# Patient Record
Sex: FEMALE | Race: WHITE | NOT HISPANIC OR LATINO | Employment: OTHER | URBAN - METROPOLITAN AREA
[De-identification: names, ages, dates, MRNs, and addresses within clinical notes are randomized per-mention and may not be internally consistent; named-entity substitution may affect disease eponyms.]

---

## 2017-06-16 ENCOUNTER — HOSPITAL ENCOUNTER (EMERGENCY)
Facility: HOSPITAL | Age: 57
Discharge: HOME | End: 2017-06-16
Attending: EMERGENCY MEDICINE
Payer: MEDICARE

## 2017-06-16 VITALS
SYSTOLIC BLOOD PRESSURE: 126 MMHG | DIASTOLIC BLOOD PRESSURE: 83 MMHG | RESPIRATION RATE: 18 BRPM | BODY MASS INDEX: 40.75 KG/M2 | HEART RATE: 99 BPM | OXYGEN SATURATION: 97 % | WEIGHT: 230 LBS | HEIGHT: 63 IN | TEMPERATURE: 98 F

## 2017-06-16 DIAGNOSIS — L03.032 PARONYCHIA OF TOE, LEFT: Primary | ICD-10-CM

## 2017-06-16 LAB — POCT GLUCOSE: 178 MG/DL (ref 70–110)

## 2017-06-16 PROCEDURE — 99283 EMERGENCY DEPT VISIT LOW MDM: CPT | Mod: 25

## 2017-06-16 PROCEDURE — 25000003 PHARM REV CODE 250: Performed by: NURSE PRACTITIONER

## 2017-06-16 PROCEDURE — 82962 GLUCOSE BLOOD TEST: CPT

## 2017-06-16 RX ORDER — MUPIROCIN 20 MG/G
1 OINTMENT TOPICAL
Status: COMPLETED | OUTPATIENT
Start: 2017-06-16 | End: 2017-06-16

## 2017-06-16 RX ORDER — PREGABALIN 50 MG/1
50 CAPSULE ORAL 3 TIMES DAILY
COMMUNITY

## 2017-06-16 RX ORDER — MUPIROCIN 20 MG/G
OINTMENT TOPICAL 2 TIMES DAILY
Qty: 15 G | Refills: 0 | Status: SHIPPED | OUTPATIENT
Start: 2017-06-16 | End: 2017-06-26

## 2017-06-16 RX ORDER — SULFAMETHOXAZOLE AND TRIMETHOPRIM 800; 160 MG/1; MG/1
1 TABLET ORAL
Status: ON HOLD | COMMUNITY
End: 2019-04-29 | Stop reason: HOSPADM

## 2017-06-16 RX ADMIN — MUPIROCIN 22 G: 20 OINTMENT TOPICAL at 02:06

## 2017-06-16 NOTE — DISCHARGE INSTRUCTIONS
Please return to the ED for any new or worsening symptoms: worsening pain or swelling, chest pain, shortness of breath, loss of consciousness or any other concerns. Please follow up with podiatry within in the week.     Take your Bactrim as prescribed. Keep toes clean and dry.

## 2017-06-16 NOTE — ED PROVIDER NOTES
Encounter Date: 6/16/2017    SCRIBE #1 NOTE: I, Julio Cesar Ortiz, am scribing for, and in the presence of, Kamryn Ernst NP. Other sections scribed: HPI, ROS.       History     Chief Complaint   Patient presents with    Toe Pain     States she is a diabetic. L great toe is red, nail is yellow, and toe feels numb. States she has neuropathy     Review of patient's allergies indicates:   Allergen Reactions    Tegretol [carbamazepine]      CC: Toe Infection  HPI: This 57 y.o. morbidly obese female with DM, epilepsy, HTN, osteoporosis and Hx of coma, renal failure presents to the ED c/o redness, numbness, and moderately painful ROM to L great toe that developed 2 days ago. Sx are acute. She reports temperature of 100 at home last night. Pt is concerned that she has has an infection. Pt states that she is about to begin taking Bactrim to treat a UTI she was recently dx'd with. Pt reports that she has appointment with her Podiatrist in 4 or 5 days. She states her CBGs typically run 170-200. She denies chills, chest pain, SOB, abdominal pain, N/V.            Past Medical History:   Diagnosis Date    Diabetes mellitus     Epilepsy     Hypertension     Osteoporosis      Past Surgical History:   Procedure Laterality Date    ABDOMINAL SURGERY      BREAST SURGERY      CHOLECYSTECTOMY       History reviewed. No pertinent family history.  Social History   Substance Use Topics    Smoking status: Never Smoker    Smokeless tobacco: Not on file    Alcohol use No     Review of Systems   Constitutional: Negative for chills and fever.   HENT: Negative for ear pain, rhinorrhea and sore throat.    Eyes: Negative for pain and visual disturbance.   Respiratory: Negative for cough and shortness of breath.    Cardiovascular: Negative for chest pain.   Gastrointestinal: Negative for abdominal pain, diarrhea, nausea and vomiting.   Genitourinary: Negative for difficulty urinating and dysuria.   Musculoskeletal: Positive for  arthralgias (L great toe).   Skin: Positive for color change (erythema to L great toe).   Neurological: Positive for numbness (L great toe). Negative for dizziness, syncope and headaches.       Physical Exam     Initial Vitals [06/16/17 1259]   BP Pulse Resp Temp SpO2   126/83 99 18 98.3 °F (36.8 °C) 97 %     Physical Exam    Constitutional: Vital signs are normal. She appears well-developed and well-nourished.  Non-toxic appearance.   Eyes: EOM are normal.   Neck: Full passive range of motion without pain. Neck supple. No no neck rigidity.   Cardiovascular: Normal rate, S1 normal, S2 normal and normal heart sounds. Exam reveals no gallop.    No murmur heard.  Pulmonary/Chest: Effort normal and breath sounds normal. No tachypnea. She has no decreased breath sounds. She has no wheezes. She has no rhonchi. She has no rales.   Musculoskeletal:   Left great toenail is yellow and thickened, partially avulsed at the medial base; mild erythema to cuticle area   Neurological: She is alert and oriented to person, place, and time. She has normal strength. Gait normal. GCS eye subscore is 4. GCS verbal subscore is 5. GCS motor subscore is 6.   Skin: Skin is warm and dry. No rash noted.         ED Course   Procedures  Labs Reviewed   POCT GLUCOSE - Abnormal; Notable for the following:        Result Value    POCT Glucose 178 (*)     All other components within normal limits             Medical Decision Making:   ED Management:  This is a 57-year-old female with diabetes who presents to the ED with complaints of pain to the left great toe.  She is afebrile and well-appearing.  She denies trauma.  She began taking Bactrim today for a UTI.  On exam, left toenail is yellow and thickened.  It is partially avulsed.  Likely paronychia with surrounding erythema.  Advised to continue her Bactrim as prescribed.  As the patient has an appointment with podiatry in a few days, I advised follow-up with her provider regarding toenail removal.   No indication for emergent drainage today.  Advised patient that her toenail may fall off over the weekend.  Local wound care given.  Blood sugar 178.  Discharged home with instructions for supportive care and follow-up.  Return precautions given.  Patient was also seen and evaluated by Dr. Desir, who agrees with her plan of care.            Scribe Attestation:   Scribe #1: I performed the above scribed service and the documentation accurately describes the services I performed. I attest to the accuracy of the note.    Attending Attestation:     Physician Attestation Statement for NP/PA:   I have conducted a face to face encounter with this patient in addition to the NP/PA, due to NP/PA Request    Other NP/PA Attestation Additions:      Medical Decision Makin y.o. female presents emergency Department with left great toe infection.  Patient has evidence of erythema surrounding left great toenail, with some avulsion of the base of the toernail and purulent appearing fluid collection. It appears to be already draining. States she is scheduled to see podiatry this upcoming week. Will start on antibiotics and send to podiatry for definitive management. I have seen and examined this patient with mid-level provider and agree with physical exam, assessment and plan.        Physician Attestation for Scribe:  Physician Attestation Statement for Scribe #1: I, Kamryn Triana NP, reviewed documentation, as scribed by Julio Cesar Alcantar in my presence, and it is both accurate and complete.                 ED Course     Clinical Impression:   The encounter diagnosis was Paronychia of toe, left.    Disposition:   Disposition: Discharged  Condition: Stable       Kamryn Triana NP  [phone]       Mary Knight MD  [phone]

## 2019-04-26 ENCOUNTER — HOSPITAL ENCOUNTER (INPATIENT)
Facility: HOSPITAL | Age: 59
LOS: 2 days | Discharge: HOME | DRG: 689 | End: 2019-04-29
Attending: EMERGENCY MEDICINE | Admitting: INTERNAL MEDICINE
Payer: MEDICARE

## 2019-04-26 DIAGNOSIS — F11.920: ICD-10-CM

## 2019-04-26 DIAGNOSIS — E86.1 HYPOVOLEMIA: ICD-10-CM

## 2019-04-26 DIAGNOSIS — N30.00 ACUTE CYSTITIS WITHOUT HEMATURIA: Primary | ICD-10-CM

## 2019-04-26 DIAGNOSIS — I95.9 HYPOTENSION, UNSPECIFIED HYPOTENSION TYPE: ICD-10-CM

## 2019-04-26 DIAGNOSIS — R41.82 ALTERED MENTAL STATUS: ICD-10-CM

## 2019-04-26 DIAGNOSIS — R91.8 PULMONARY INFILTRATE: ICD-10-CM

## 2019-04-26 DIAGNOSIS — E86.0 DEHYDRATION: ICD-10-CM

## 2019-04-26 DIAGNOSIS — I95.9 HYPOTENSION: ICD-10-CM

## 2019-04-26 PROBLEM — J18.9 PNEUMONIA DUE TO INFECTIOUS ORGANISM: Status: ACTIVE | Noted: 2019-04-26

## 2019-04-26 PROBLEM — I10 ESSENTIAL HYPERTENSION: Status: ACTIVE | Noted: 2019-04-26

## 2019-04-26 PROBLEM — G93.40 ACUTE ENCEPHALOPATHY: Status: ACTIVE | Noted: 2019-04-26

## 2019-04-26 PROBLEM — N18.30 CKD (CHRONIC KIDNEY DISEASE) STAGE 3, GFR 30-59 ML/MIN: Status: ACTIVE | Noted: 2019-04-26

## 2019-04-26 LAB
ALBUMIN SERPL BCP-MCNC: 3 G/DL (ref 3.5–5.2)
ALP SERPL-CCNC: 95 U/L (ref 55–135)
ALT SERPL W/O P-5'-P-CCNC: 7 U/L (ref 10–44)
ANION GAP SERPL CALC-SCNC: 5 MMOL/L (ref 8–16)
APTT BLDCRRT: 30.9 SEC (ref 21–32)
AST SERPL-CCNC: 10 U/L (ref 10–40)
BACTERIA #/AREA URNS HPF: ABNORMAL /HPF
BASOPHILS # BLD AUTO: 0.03 K/UL (ref 0–0.2)
BASOPHILS NFR BLD: 0.2 % (ref 0–1.9)
BILIRUB SERPL-MCNC: 0.3 MG/DL (ref 0.1–1)
BILIRUB UR QL STRIP: ABNORMAL
BNP SERPL-MCNC: 34 PG/ML (ref 0–99)
BUN SERPL-MCNC: 24 MG/DL (ref 8–23)
CALCIUM SERPL-MCNC: 9.5 MG/DL (ref 8.7–10.5)
CHLORIDE SERPL-SCNC: 101 MMOL/L (ref 95–110)
CK SERPL-CCNC: 106 U/L (ref 20–180)
CLARITY UR: ABNORMAL
CO2 SERPL-SCNC: 28 MMOL/L (ref 23–29)
COLOR UR: ABNORMAL
CREAT SERPL-MCNC: 1.8 MG/DL (ref 0.5–1.4)
CTP QC/QA: YES
DIFFERENTIAL METHOD: ABNORMAL
EOSINOPHIL # BLD AUTO: 0 K/UL (ref 0–0.5)
EOSINOPHIL NFR BLD: 0 % (ref 0–8)
ERYTHROCYTE [DISTWIDTH] IN BLOOD BY AUTOMATED COUNT: 15.5 % (ref 11.5–14.5)
EST. GFR  (AFRICAN AMERICAN): 35 ML/MIN/1.73 M^2
EST. GFR  (NON AFRICAN AMERICAN): 30 ML/MIN/1.73 M^2
FLUAV AG NPH QL: NEGATIVE
FLUBV AG NPH QL: NEGATIVE
GLUCOSE SERPL-MCNC: 285 MG/DL (ref 70–110)
GLUCOSE UR QL STRIP: ABNORMAL
HCT VFR BLD AUTO: 34 % (ref 37–48.5)
HGB BLD-MCNC: 10.6 G/DL (ref 12–16)
HGB UR QL STRIP: ABNORMAL
HYALINE CASTS #/AREA URNS LPF: 0 /LPF
INR PPP: 1 (ref 0.8–1.2)
KETONES UR QL STRIP: ABNORMAL
LACTATE SERPL-SCNC: 0.8 MMOL/L (ref 0.5–2.2)
LEUKOCYTE ESTERASE UR QL STRIP: ABNORMAL
LIPASE SERPL-CCNC: 9 U/L (ref 4–60)
LYMPHOCYTES # BLD AUTO: 1.1 K/UL (ref 1–4.8)
LYMPHOCYTES NFR BLD: 8.5 % (ref 18–48)
MAGNESIUM SERPL-MCNC: 1.6 MG/DL (ref 1.6–2.6)
MCH RBC QN AUTO: 26.7 PG (ref 27–31)
MCHC RBC AUTO-ENTMCNC: 31.2 G/DL (ref 32–36)
MCV RBC AUTO: 86 FL (ref 82–98)
MICROSCOPIC COMMENT: ABNORMAL
MONOCYTES # BLD AUTO: 0.9 K/UL (ref 0.3–1)
MONOCYTES NFR BLD: 7.2 % (ref 4–15)
NEUTROPHILS # BLD AUTO: 10.6 K/UL (ref 1.8–7.7)
NEUTROPHILS NFR BLD: 84.1 % (ref 38–73)
NITRITE UR QL STRIP: NEGATIVE
PH UR STRIP: 5 [PH] (ref 5–8)
PHOSPHATE SERPL-MCNC: 3.4 MG/DL (ref 2.7–4.5)
PLATELET # BLD AUTO: 270 K/UL (ref 150–350)
PMV BLD AUTO: 10.6 FL (ref 9.2–12.9)
POCT GLUCOSE: 225 MG/DL (ref 70–110)
POTASSIUM SERPL-SCNC: 4.1 MMOL/L (ref 3.5–5.1)
PROCALCITONIN SERPL IA-MCNC: 0.32 NG/ML
PROT SERPL-MCNC: 7.1 G/DL (ref 6–8.4)
PROT UR QL STRIP: ABNORMAL
PROTHROMBIN TIME: 10.5 SEC (ref 9–12.5)
RBC # BLD AUTO: 3.97 M/UL (ref 4–5.4)
RBC #/AREA URNS HPF: 2 /HPF (ref 0–4)
SODIUM SERPL-SCNC: 134 MMOL/L (ref 136–145)
SP GR UR STRIP: 1.02 (ref 1–1.03)
TROPONIN I SERPL DL<=0.01 NG/ML-MCNC: <0.006 NG/ML (ref 0–0.03)
URN SPEC COLLECT METH UR: ABNORMAL
UROBILINOGEN UR STRIP-ACNC: ABNORMAL EU/DL
WBC # BLD AUTO: 12.57 K/UL (ref 3.9–12.7)
WBC #/AREA URNS HPF: >100 /HPF (ref 0–5)

## 2019-04-26 PROCEDURE — 96376 TX/PRO/DX INJ SAME DRUG ADON: CPT | Performed by: EMERGENCY MEDICINE

## 2019-04-26 PROCEDURE — 96372 THER/PROPH/DIAG INJ SC/IM: CPT | Mod: 59 | Performed by: EMERGENCY MEDICINE

## 2019-04-26 PROCEDURE — 96365 THER/PROPH/DIAG IV INF INIT: CPT

## 2019-04-26 PROCEDURE — P9612 CATHETERIZE FOR URINE SPEC: HCPCS

## 2019-04-26 PROCEDURE — 84145 PROCALCITONIN (PCT): CPT

## 2019-04-26 PROCEDURE — 83690 ASSAY OF LIPASE: CPT

## 2019-04-26 PROCEDURE — 82550 ASSAY OF CK (CPK): CPT

## 2019-04-26 PROCEDURE — 96366 THER/PROPH/DIAG IV INF ADDON: CPT

## 2019-04-26 PROCEDURE — 83735 ASSAY OF MAGNESIUM: CPT

## 2019-04-26 PROCEDURE — 96361 HYDRATE IV INFUSION ADD-ON: CPT | Performed by: EMERGENCY MEDICINE

## 2019-04-26 PROCEDURE — 81000 URINALYSIS NONAUTO W/SCOPE: CPT

## 2019-04-26 PROCEDURE — S5571 INSULIN DISPOS PEN 3 ML: HCPCS | Performed by: NURSE PRACTITIONER

## 2019-04-26 PROCEDURE — 25000003 PHARM REV CODE 250: Performed by: EMERGENCY MEDICINE

## 2019-04-26 PROCEDURE — 87040 BLOOD CULTURE FOR BACTERIA: CPT | Mod: 59

## 2019-04-26 PROCEDURE — 25000003 PHARM REV CODE 250: Performed by: NURSE PRACTITIONER

## 2019-04-26 PROCEDURE — 93010 EKG 12-LEAD: ICD-10-PCS | Mod: ,,, | Performed by: INTERNAL MEDICINE

## 2019-04-26 PROCEDURE — 93005 ELECTROCARDIOGRAM TRACING: CPT

## 2019-04-26 PROCEDURE — 87088 URINE BACTERIA CULTURE: CPT

## 2019-04-26 PROCEDURE — 84100 ASSAY OF PHOSPHORUS: CPT

## 2019-04-26 PROCEDURE — 63600175 PHARM REV CODE 636 W HCPCS: Performed by: NURSE PRACTITIONER

## 2019-04-26 PROCEDURE — 84484 ASSAY OF TROPONIN QUANT: CPT

## 2019-04-26 PROCEDURE — 99291 CRITICAL CARE FIRST HOUR: CPT | Mod: 25

## 2019-04-26 PROCEDURE — 87502 INFLUENZA DNA AMP PROBE: CPT

## 2019-04-26 PROCEDURE — 83605 ASSAY OF LACTIC ACID: CPT

## 2019-04-26 PROCEDURE — 87077 CULTURE AEROBIC IDENTIFY: CPT

## 2019-04-26 PROCEDURE — 85025 COMPLETE CBC W/AUTO DIFF WBC: CPT

## 2019-04-26 PROCEDURE — 87186 SC STD MICRODIL/AGAR DIL: CPT

## 2019-04-26 PROCEDURE — 83880 ASSAY OF NATRIURETIC PEPTIDE: CPT

## 2019-04-26 PROCEDURE — 85610 PROTHROMBIN TIME: CPT

## 2019-04-26 PROCEDURE — 93010 ELECTROCARDIOGRAM REPORT: CPT | Mod: ,,, | Performed by: INTERNAL MEDICINE

## 2019-04-26 PROCEDURE — 80053 COMPREHEN METABOLIC PANEL: CPT

## 2019-04-26 PROCEDURE — 85730 THROMBOPLASTIN TIME PARTIAL: CPT

## 2019-04-26 PROCEDURE — 96367 TX/PROPH/DG ADDL SEQ IV INF: CPT

## 2019-04-26 PROCEDURE — 63600175 PHARM REV CODE 636 W HCPCS: Performed by: EMERGENCY MEDICINE

## 2019-04-26 PROCEDURE — 87086 URINE CULTURE/COLONY COUNT: CPT

## 2019-04-26 PROCEDURE — G0378 HOSPITAL OBSERVATION PER HR: HCPCS

## 2019-04-26 RX ORDER — PRIMIDONE 50 MG/1
100 TABLET ORAL 2 TIMES DAILY
Status: DISCONTINUED | OUTPATIENT
Start: 2019-04-26 | End: 2019-04-26

## 2019-04-26 RX ORDER — DULOXETIN HYDROCHLORIDE 30 MG/1
60 CAPSULE, DELAYED RELEASE ORAL DAILY
Status: DISCONTINUED | OUTPATIENT
Start: 2019-04-27 | End: 2019-04-27

## 2019-04-26 RX ORDER — IBUPROFEN 200 MG
16 TABLET ORAL
Status: DISCONTINUED | OUTPATIENT
Start: 2019-04-26 | End: 2019-04-29 | Stop reason: HOSPADM

## 2019-04-26 RX ORDER — GLUCAGON 1 MG
1 KIT INJECTION
Status: DISCONTINUED | OUTPATIENT
Start: 2019-04-26 | End: 2019-04-29 | Stop reason: HOSPADM

## 2019-04-26 RX ORDER — LISINOPRIL 40 MG/1
40 TABLET ORAL DAILY
Status: ON HOLD | COMMUNITY
End: 2019-04-29 | Stop reason: HOSPADM

## 2019-04-26 RX ORDER — TRAZODONE HYDROCHLORIDE 100 MG/1
100 TABLET ORAL NIGHTLY
Status: ON HOLD | COMMUNITY
End: 2019-04-27

## 2019-04-26 RX ORDER — IBUPROFEN 200 MG
24 TABLET ORAL
Status: DISCONTINUED | OUTPATIENT
Start: 2019-04-26 | End: 2019-04-29 | Stop reason: HOSPADM

## 2019-04-26 RX ORDER — PREGABALIN 50 MG/1
50 CAPSULE ORAL 3 TIMES DAILY
Status: DISCONTINUED | OUTPATIENT
Start: 2019-04-27 | End: 2019-04-27

## 2019-04-26 RX ORDER — PRAVASTATIN SODIUM 40 MG/1
80 TABLET ORAL NIGHTLY
Status: DISCONTINUED | OUTPATIENT
Start: 2019-04-26 | End: 2019-04-29 | Stop reason: HOSPADM

## 2019-04-26 RX ORDER — ENOXAPARIN SODIUM 100 MG/ML
40 INJECTION SUBCUTANEOUS EVERY 24 HOURS
Status: DISCONTINUED | OUTPATIENT
Start: 2019-04-26 | End: 2019-04-29 | Stop reason: HOSPADM

## 2019-04-26 RX ORDER — POLYETHYLENE GLYCOL 3350 17 G/17G
17 POWDER, FOR SOLUTION ORAL DAILY
Status: DISCONTINUED | OUTPATIENT
Start: 2019-04-27 | End: 2019-04-29 | Stop reason: HOSPADM

## 2019-04-26 RX ORDER — PREGABALIN 50 MG/1
50 CAPSULE ORAL 3 TIMES DAILY
Status: DISCONTINUED | OUTPATIENT
Start: 2019-04-26 | End: 2019-04-26

## 2019-04-26 RX ORDER — ACETAMINOPHEN 325 MG/1
325 TABLET ORAL EVERY 6 HOURS PRN
Status: DISCONTINUED | OUTPATIENT
Start: 2019-04-26 | End: 2019-04-29 | Stop reason: HOSPADM

## 2019-04-26 RX ORDER — ZONISAMIDE 100 MG/1
200 CAPSULE ORAL 2 TIMES DAILY
Status: DISCONTINUED | OUTPATIENT
Start: 2019-04-26 | End: 2019-04-27

## 2019-04-26 RX ORDER — LEVETIRACETAM 100 MG/ML
750 SOLUTION ORAL 2 TIMES DAILY
Status: DISCONTINUED | OUTPATIENT
Start: 2019-04-26 | End: 2019-04-26

## 2019-04-26 RX ORDER — PRIMIDONE 50 MG/1
100 TABLET ORAL 2 TIMES DAILY
Status: DISCONTINUED | OUTPATIENT
Start: 2019-04-27 | End: 2019-04-27

## 2019-04-26 RX ORDER — MIRTAZAPINE 7.5 MG/1
7.5 TABLET, FILM COATED ORAL NIGHTLY
Status: DISCONTINUED | OUTPATIENT
Start: 2019-04-26 | End: 2019-04-26

## 2019-04-26 RX ORDER — OXYCODONE HYDROCHLORIDE 5 MG/1
5 TABLET ORAL EVERY 4 HOURS PRN
Status: DISCONTINUED | OUTPATIENT
Start: 2019-04-26 | End: 2019-04-26

## 2019-04-26 RX ORDER — SODIUM CHLORIDE 0.9 % (FLUSH) 0.9 %
10 SYRINGE (ML) INJECTION
Status: DISCONTINUED | OUTPATIENT
Start: 2019-04-26 | End: 2019-04-29 | Stop reason: HOSPADM

## 2019-04-26 RX ORDER — METOPROLOL TARTRATE 25 MG/1
25 TABLET, FILM COATED ORAL 2 TIMES DAILY
COMMUNITY

## 2019-04-26 RX ORDER — DULOXETIN HYDROCHLORIDE 60 MG/1
60 CAPSULE, DELAYED RELEASE ORAL DAILY
COMMUNITY

## 2019-04-26 RX ORDER — SODIUM CHLORIDE 9 MG/ML
INJECTION, SOLUTION INTRAVENOUS CONTINUOUS
Status: DISCONTINUED | OUTPATIENT
Start: 2019-04-26 | End: 2019-04-29

## 2019-04-26 RX ORDER — AMLODIPINE BESYLATE 10 MG/1
10 TABLET ORAL DAILY
Status: ON HOLD | COMMUNITY
End: 2019-04-29 | Stop reason: HOSPADM

## 2019-04-26 RX ORDER — INSULIN ASPART 100 [IU]/ML
1-10 INJECTION, SOLUTION INTRAVENOUS; SUBCUTANEOUS
Status: DISCONTINUED | OUTPATIENT
Start: 2019-04-26 | End: 2019-04-29 | Stop reason: HOSPADM

## 2019-04-26 RX ADMIN — PIPERACILLIN AND TAZOBACTAM 4.5 G: 4; .5 INJECTION, POWDER, LYOPHILIZED, FOR SOLUTION INTRAVENOUS; PARENTERAL at 10:04

## 2019-04-26 RX ADMIN — INSULIN ASPART 2 UNITS: 100 INJECTION, SOLUTION INTRAVENOUS; SUBCUTANEOUS at 10:04

## 2019-04-26 RX ADMIN — SODIUM CHLORIDE: 0.9 INJECTION, SOLUTION INTRAVENOUS at 10:04

## 2019-04-26 RX ADMIN — SODIUM CHLORIDE 1000 ML: 0.9 INJECTION, SOLUTION INTRAVENOUS at 05:04

## 2019-04-26 RX ADMIN — ZONISAMIDE 200 MG: 100 CAPSULE ORAL at 10:04

## 2019-04-26 RX ADMIN — SODIUM CHLORIDE 3267 ML: 0.9 INJECTION, SOLUTION INTRAVENOUS at 02:04

## 2019-04-26 RX ADMIN — PRAVASTATIN SODIUM 80 MG: 40 TABLET ORAL at 10:04

## 2019-04-26 RX ADMIN — INSULIN DETEMIR 10 UNITS: 100 INJECTION, SOLUTION SUBCUTANEOUS at 10:04

## 2019-04-26 RX ADMIN — VANCOMYCIN HYDROCHLORIDE 1750 MG: 500 INJECTION, POWDER, LYOPHILIZED, FOR SOLUTION INTRAVENOUS at 03:04

## 2019-04-26 RX ADMIN — PIPERACILLIN AND TAZOBACTAM 4.5 G: 4; .5 INJECTION, POWDER, LYOPHILIZED, FOR SOLUTION INTRAVENOUS; PARENTERAL at 03:04

## 2019-04-26 RX ADMIN — ENOXAPARIN SODIUM 40 MG: 100 INJECTION SUBCUTANEOUS at 10:04

## 2019-04-26 NOTE — ED PROVIDER NOTES
"Encounter Date: 4/26/2019    SCRIBE #1 NOTE: I, Naresh Gomez, am scribing for, and in the presence of,  Victor Hugo Sung MD. I have scribed the following portions of the note - Other sections scribed: HPI and ROS.       History     Chief Complaint   Patient presents with    Altered Mental Status     Per EMS, family called for pt being " in and out of conciousness" x 3 days. Family reports pt incontinent and urinating on couch. Pt given 1mg narcan with pupil response. Pt responds to verbal.      CC: Altered Mental Status    HPI: This 59 y.o F with a hx of DM, Epilepsy, HTN, chronic knee pain and osteoporosis presents to the ED via EMS c/o AMS x3 days. Per EMS, the pt's son reported the pt has been "in and out of consciousness." EMS reports an initial CBG of 301 mg/dL, BP of 90/56mmHg, pinpoint pupils and slurred speech. EMS administered a 350cc saline Bolus and 1mg of narcan which improved her slurred speech and respiratory rate. Additionally, the pt also reports urinary incontinence, dysuria, suprapubic, diarrhea and chills. The pt denies fever, chills, diaphoresis, nausea, emesis, abdominal pain, chest pain, cough, otalgia, sore throat and rash. She does not smoke cigarettes, consume EtOH or use illicit drugs. She does take Oxycontin 2mg x4/day for her chronic knee pain.    The history is provided by the patient. No  was used.     Review of patient's allergies indicates:   Allergen Reactions    Tegretol [carbamazepine]      Past Medical History:   Diagnosis Date    Diabetes mellitus     Epilepsy     Hypertension     Osteoporosis      Past Surgical History:   Procedure Laterality Date    ABDOMINAL SURGERY      BREAST SURGERY      CHOLECYSTECTOMY       History reviewed. No pertinent family history.  Social History     Tobacco Use    Smoking status: Never Smoker   Substance Use Topics    Alcohol use: No    Drug use: No     Review of Systems   Constitutional: Positive for chills. Negative " for diaphoresis and fever.   HENT: Negative for congestion, ear pain, rhinorrhea and sore throat.    Eyes: Negative for pain and visual disturbance.   Respiratory: Negative for cough and shortness of breath.    Cardiovascular: Negative for chest pain.   Gastrointestinal: Positive for abdominal pain (suprapubic) and diarrhea. Negative for nausea and vomiting.   Genitourinary: Positive for dysuria.        (+) urinary incontinence   Musculoskeletal: Negative for back pain and neck pain.        (+) chronic knee pain   Skin: Negative for rash.   Neurological: Negative for weakness, numbness and headaches.        (+) slurred speech       Physical Exam     Initial Vitals [04/26/19 1348]   BP Pulse Resp Temp SpO2   135/66 (!) 114 18 99.1 °F (37.3 °C) 100 %      MAP       --         Physical Exam  The patient was examined specifically for the following:   General:No significant distress, Good color, Warm and dry. Head and neck:Scalp atraumatic, Neck supple. Neurological:Appropriate conversation, Gross motor deficits. Eyes:Conjugate gaze, Clear corneas. ENT: No epistaxis. Cardiac: Regular rate and rhythm, Grossly normal heart tones. Pulmonary: Wheezing, Rales. Gastrointestinal: Abdominal tenderness, Abdominal distention. Musculoskeletal: Extremity deformity, Apparent pain with range of motion of the joints. Skin: Rash.   The findings on examination were normal except for the following:  Patient is morbidly obese.  She has pain with range of motion of both knees.  The neck is supple. The patient is conversational and appropriate conversation.  Cranial nerves motor and sensory examination grossly unremarkable.  Lungs are clear and free of wheezing rales or rhonchi.  The abdomen is nontender.  There is no guarding rebound mass or distention. The patient remains sleepy.  ED Course   Critical Care  Date/Time: 4/26/2019 6:05 PM  Performed by: Victor Hugo Sung MD  Authorized by: Victor Hugo Sung MD   Direct patient critical care  time: 22 minutes  Additional history critical care time: 11 minutes  Ordering / reviewing critical care time: 11 minutes  Documentation critical care time: 11 minutes  Consulting other physicians critical care time: 5 minutes  Consult with family critical care time: 5 minutes  Total critical care time (exclusive of procedural time) : 65 minutes  Critical care time was exclusive of teaching time and separately billable procedures and treating other patients.  Critical care was necessary to treat or prevent imminent or life-threatening deterioration of the following conditions: circulatory failure, CNS failure or compromise and shock.  Critical care was time spent personally by me on the following activities: discussions with primary provider, evaluation of patient's response to treatment, obtaining history from patient or surrogate, ordering and review of laboratory studies, pulse oximetry, review of old charts, re-evaluation of patient's condition, ordering and review of radiographic studies, ordering and performing treatments and interventions and examination of patient.        Labs Reviewed   CBC W/ AUTO DIFFERENTIAL - Abnormal; Notable for the following components:       Result Value    RBC 3.97 (*)     Hemoglobin 10.6 (*)     Hematocrit 34.0 (*)     MCH 26.7 (*)     MCHC 31.2 (*)     RDW 15.5 (*)     Gran # (ANC) 10.6 (*)     Gran% 84.1 (*)     Lymph% 8.5 (*)     All other components within normal limits   COMPREHENSIVE METABOLIC PANEL - Abnormal; Notable for the following components:    Sodium 134 (*)     Glucose 285 (*)     BUN, Bld 24 (*)     Creatinine 1.8 (*)     Albumin 3.0 (*)     ALT 7 (*)     Anion Gap 5 (*)     eGFR if  35 (*)     eGFR if non  30 (*)     All other components within normal limits   PROCALCITONIN - Abnormal; Notable for the following components:    Procalcitonin 0.32 (*)     All other components within normal limits   URINALYSIS - Abnormal; Notable for the  following components:    Appearance, UA Cloudy (*)     Protein, UA 2+ (*)     Glucose, UA 1+ (*)     Ketones, UA Trace (*)     Bilirubin (UA) 1+ (*)     Occult Blood UA 1+ (*)     Urobilinogen, UA 2.0-3.0 (*)     Leukocytes, UA 2+ (*)     All other components within normal limits   URINALYSIS MICROSCOPIC - Abnormal; Notable for the following components:    WBC, UA >100 (*)     Bacteria, UA Few (*)     All other components within normal limits   CULTURE, BLOOD   CULTURE, BLOOD   CULTURE, URINE   LACTIC ACID, PLASMA   MAGNESIUM   PHOSPHORUS   APTT   PROTIME-INR   B-TYPE NATRIURETIC PEPTIDE   LIPASE   TROPONIN I   POCT INFLUENZA A/B     EKG Readings: (Independently Interpreted)   This patient is in a normal sinus tachycardia with a heart rate of 114.  The patient has right bundle branch block.  There are nonspecific ST segment and T-wave changes.  There is no definite evidence of acute myocardial infarction or malignant arrhythmia.      ECG Results          EKG 12-lead (In process)  Result time 04/26/19 14:34:46    In process by Interface, Lab In [facility] (04/26/19 14:34:46)                 Narrative:    Test Reason : R41.82,    Vent. Rate : 114 BPM     Atrial Rate : 114 BPM     P-R Int : 150 ms          QRS Dur : 144 ms      QT Int : 358 ms       P-R-T Axes : 059 081 027 degrees     QTc Int : 493 ms    Sinus tachycardia  Right bundle branch block  Possible Inferior infarct (cited on or before 15-JUL-2016)  Abnormal ECG  When compared with ECG of 15-JUL-2016 19:39,  No significant change was found    Referred By: System System           Confirmed By:                   In process by Interface, Lab In [facility] (04/26/19 14:28:20)                 Narrative:    Test Reason : R41.82,    Vent. Rate : 114 BPM     Atrial Rate : 114 BPM     P-R Int : 150 ms          QRS Dur : 144 ms      QT Int : 358 ms       P-R-T Axes : 059 081 027 degrees     QTc Int : 493 ms    Sinus tachycardia  Right bundle branch block  Possible Inferior  infarct (cited on or before 15-JUL-2016)  Abnormal ECG  When compared with ECG of 15-JUL-2016 19:39,  No significant change was found    Referred By: System System           Confirmed By:                             Imaging Results          X-Ray Chest AP Portable (Final result)  Result time 04/26/19 14:24:46    Final result by Kvng Bunn MD (04/26/19 14:24:46)                 Impression:      Ill-defined patchy bilateral airspace opacities with predominance in the right hemithorax.  The findings may represent evolving multifocal pneumonia or worsening pulmonary edema.    Stable enlargement of the cardiomediastinal silhouette.    Interval removal of right-sided access catheters.      Electronically signed by: Kvng Bunn MD  Date:    04/26/2019  Time:    14:24             Narrative:    EXAMINATION:  XR CHEST AP PORTABLE    CLINICAL HISTORY:  Sepsis;    TECHNIQUE:  Single frontal view of the chest was performed.    COMPARISON:  07/26/2016.    FINDINGS:  The right sided central access catheters are no longer present.  The trachea is unremarkable.  There is stable enlargement of the cardiomediastinal silhouette.  The hemidiaphragms are unremarkable.  There is no evidence of free air beneath the hemidiaphragms.  There are no pleural effusions.  There is no evidence of a pneumothorax.  There is no evidence of pneumomediastinum.  There are ill-defined patchy bilateral airspace opacities with predominance in the right hemithorax.  There are degenerative changes in the osseous structures.                              Medical decision making:  Given the above, this patient presents to the emergency with an altered mental status.  Her urine is positive for greater than 100 white cells per high-power field by catheterized specimen.  I believe the patient likely has urinary tract infection she has dysuria and urinary incontinence.  She also has some infiltrates on chest x-ray.  A BNP is pending.  Pneumonia is considered but  she has very little cough.  She is awake and alert and conversational at 6:00 p.m..  She is not febrile or toxic.  She was tachycardic on arrival.  Her heart rate is now 91.  Despite 30 milliliters/kilogram of IV fluid she has made very little urine.  I believe that she is dehydrated.  I believe narcotics are playing a role in her altered mental status.  I will admit and treat with IV antibiotics and have her evaluated by Hospital Medicine                Scribe Attestation:   Scribe #1: I performed the above scribed service and the documentation accurately describes the services I performed. I attest to the accuracy of the note.    Attending Attestation:           Physician Attestation for Scribe:  Physician Attestation Statement for Scribe #1: I, Victor Hugo Sung MD, reviewed documentation, as scribed by Naresh Gomez in my presence, and it is both accurate and complete.                    Clinical Impression:       ICD-10-CM ICD-9-CM   1. Acute cystitis without hematuria N30.00 595.0   2. Altered mental status R41.82 780.97   3. Pulmonary infiltrate R91.8 793.19   4. Acute narcotic intoxication without complication F11.920 305.80   5. Dehydration E86.0 276.51   6. Hypotension, unspecified hypotension type I95.9 458.9                                Victor Hugo Sung MD  04/26/19 9961

## 2019-04-26 NOTE — PROGRESS NOTES
Pharmacokinetic Initial Assessment: IV Vancomycin    Assessment/Plan:    Initiate intravenous vancomycin with loading dose of 1750 mg once followed by a maintenance dose of vancomycin 1250mg IV every 24 hours  Desired empiric serum trough concentration is 10 to 20 mcg/mL.  Draw vancomycin trough level 30 min prior to fourth dose on 4/29/19 at approximately 15:15  Pharmacy will continue to follow and monitor vancomycin.      Please contact pharmacy at extension [phone] with any questions regarding this assessment.     Thank you for the consult,   Hafsa Liao     Patient brief summary:  Carolyn Mace is a 59 y.o. female initiated on antimicrobial therapy with IV Vancomycin for treatment of suspected urinary tract infection    Drug Allergies:   Review of patient's allergies indicates:   Allergen Reactions    Tegretol [carbamazepine]        Actual Body Weight:   108.9kg    Renal Function:   Estimated Creatinine Clearance: 38.9 mL/min (A) (based on SCr of 1.8 mg/dL (H)).,     Dialysis Method (if applicable):      CBC (last 72 hours):  Recent Labs   Lab Result Units 04/26/19  1455   WBC K/uL 12.57   Hemoglobin g/dL 10.6*   Hematocrit % 34.0*   Platelets K/uL 270   Gran% % 84.1*   Lymph% % 8.5*   Mono% % 7.2   Eosinophil% % 0.0   Basophil% % 0.2   Differential Method  Automated       Metabolic Panel (last 72 hours):  Recent Labs   Lab Result Units 04/26/19  1455 04/26/19  1459   Sodium mmol/L 134*  --    Potassium mmol/L 4.1  --    Chloride mmol/L 101  --    CO2 mmol/L 28  --    Glucose mg/dL 285*  --    Glucose, UA   --  1+*   BUN, Bld mg/dL 24*  --    Creatinine mg/dL 1.8*  --    Albumin g/dL 3.0*  --    Total Bilirubin mg/dL 0.3  --    Alkaline Phosphatase U/L 95  --    AST U/L 10  --    ALT U/L 7*  --    Magnesium mg/dL 1.6  --    Phosphorus mg/dL 3.4  --        Drug levels (last 3 results):  No results for input(s): VANCOMYCINRA, VANCOMYCINPE, VANCOMYCINTR in the last 72 hours.    Microbiologic  Results:  Microbiology Results (last 7 days)     Procedure Component Value Units Date/Time    Blood culture x two cultures. Draw prior to antibiotics. [[id number]] Collected:  04/26/19 1455    Order Status:  Sent Specimen:  Blood from Peripheral, Antecubital, Left Updated:  04/26/19 1517    Blood culture x two cultures. Draw prior to antibiotics. [[id number]] Collected:  04/26/19 1505    Order Status:  Sent Specimen:  Blood from Peripheral, Antecubital, Right Updated:  04/26/19 1517    Urine culture [[id number]] Collected:  04/26/19 1449    Order Status:  Sent Specimen:  Urine, Catheterized Updated:  04/26/19 1516

## 2019-04-27 PROBLEM — E86.1 HYPOVOLEMIA: Status: ACTIVE | Noted: 2019-04-27

## 2019-04-27 LAB
ANION GAP SERPL CALC-SCNC: 6 MMOL/L (ref 8–16)
BASOPHILS # BLD AUTO: 0.02 K/UL (ref 0–0.2)
BASOPHILS NFR BLD: 0.2 % (ref 0–1.9)
BUN SERPL-MCNC: 22 MG/DL (ref 8–23)
CALCIUM SERPL-MCNC: 8.4 MG/DL (ref 8.7–10.5)
CHLORIDE SERPL-SCNC: 108 MMOL/L (ref 95–110)
CO2 SERPL-SCNC: 23 MMOL/L (ref 23–29)
CREAT SERPL-MCNC: 1.2 MG/DL (ref 0.5–1.4)
DIFFERENTIAL METHOD: ABNORMAL
EOSINOPHIL # BLD AUTO: 0.1 K/UL (ref 0–0.5)
EOSINOPHIL NFR BLD: 0.9 % (ref 0–8)
ERYTHROCYTE [DISTWIDTH] IN BLOOD BY AUTOMATED COUNT: 15.6 % (ref 11.5–14.5)
EST. GFR  (AFRICAN AMERICAN): 56 ML/MIN/1.73 M^2
EST. GFR  (NON AFRICAN AMERICAN): 49 ML/MIN/1.73 M^2
ESTIMATED AVG GLUCOSE: 200 MG/DL (ref 68–131)
FOLATE SERPL-MCNC: 11.8 NG/ML (ref 4–24)
GLUCOSE SERPL-MCNC: 174 MG/DL (ref 70–110)
HBA1C MFR BLD HPLC: 8.6 % (ref 4–5.6)
HCT VFR BLD AUTO: 33.6 % (ref 37–48.5)
HGB BLD-MCNC: 10.3 G/DL (ref 12–16)
IRON SERPL-MCNC: 11 UG/DL (ref 30–160)
LYMPHOCYTES # BLD AUTO: 2 K/UL (ref 1–4.8)
LYMPHOCYTES NFR BLD: 20.2 % (ref 18–48)
MCH RBC QN AUTO: 26.3 PG (ref 27–31)
MCHC RBC AUTO-ENTMCNC: 30.7 G/DL (ref 32–36)
MCV RBC AUTO: 86 FL (ref 82–98)
MONOCYTES # BLD AUTO: 0.9 K/UL (ref 0.3–1)
MONOCYTES NFR BLD: 9.4 % (ref 4–15)
NEUTROPHILS # BLD AUTO: 6.9 K/UL (ref 1.8–7.7)
NEUTROPHILS NFR BLD: 69.3 % (ref 38–73)
PLATELET # BLD AUTO: 224 K/UL (ref 150–350)
PMV BLD AUTO: 10.3 FL (ref 9.2–12.9)
POCT GLUCOSE: 167 MG/DL (ref 70–110)
POCT GLUCOSE: 204 MG/DL (ref 70–110)
POCT GLUCOSE: 209 MG/DL (ref 70–110)
POCT GLUCOSE: 242 MG/DL (ref 70–110)
POTASSIUM SERPL-SCNC: 4.3 MMOL/L (ref 3.5–5.1)
RBC # BLD AUTO: 3.91 M/UL (ref 4–5.4)
SATURATED IRON: 5 % (ref 20–50)
SODIUM SERPL-SCNC: 137 MMOL/L (ref 136–145)
TOTAL IRON BINDING CAPACITY: 234 UG/DL (ref 250–450)
TRANSFERRIN SERPL-MCNC: 158 MG/DL (ref 200–375)
TRANSFERRIN SERPL-MCNC: 158 MG/DL (ref 200–375)
VIT B12 SERPL-MCNC: 644 PG/ML (ref 210–950)
WBC # BLD AUTO: 9.91 K/UL (ref 3.9–12.7)

## 2019-04-27 PROCEDURE — 63600175 PHARM REV CODE 636 W HCPCS: Performed by: EMERGENCY MEDICINE

## 2019-04-27 PROCEDURE — 25000003 PHARM REV CODE 250: Performed by: EMERGENCY MEDICINE

## 2019-04-27 PROCEDURE — 85025 COMPLETE CBC W/AUTO DIFF WBC: CPT

## 2019-04-27 PROCEDURE — 82728 ASSAY OF FERRITIN: CPT

## 2019-04-27 PROCEDURE — 83540 ASSAY OF IRON: CPT

## 2019-04-27 PROCEDURE — 96361 HYDRATE IV INFUSION ADD-ON: CPT | Performed by: EMERGENCY MEDICINE

## 2019-04-27 PROCEDURE — 25000003 PHARM REV CODE 250: Performed by: NURSE PRACTITIONER

## 2019-04-27 PROCEDURE — 99222 PR INITIAL HOSPITAL CARE,LEVL II: ICD-10-PCS | Mod: ,,, | Performed by: PSYCHIATRY & NEUROLOGY

## 2019-04-27 PROCEDURE — 96376 TX/PRO/DX INJ SAME DRUG ADON: CPT | Performed by: EMERGENCY MEDICINE

## 2019-04-27 PROCEDURE — 25000003 PHARM REV CODE 250: Performed by: HOSPITALIST

## 2019-04-27 PROCEDURE — 82746 ASSAY OF FOLIC ACID SERUM: CPT

## 2019-04-27 PROCEDURE — 21400001 HC TELEMETRY ROOM

## 2019-04-27 PROCEDURE — 80048 BASIC METABOLIC PNL TOTAL CA: CPT

## 2019-04-27 PROCEDURE — 96372 THER/PROPH/DIAG INJ SC/IM: CPT | Mod: 59 | Performed by: EMERGENCY MEDICINE

## 2019-04-27 PROCEDURE — 94799 UNLISTED PULMONARY SVC/PX: CPT

## 2019-04-27 PROCEDURE — 99222 1ST HOSP IP/OBS MODERATE 55: CPT | Mod: ,,, | Performed by: PSYCHIATRY & NEUROLOGY

## 2019-04-27 PROCEDURE — 82607 VITAMIN B-12: CPT

## 2019-04-27 PROCEDURE — 83036 HEMOGLOBIN GLYCOSYLATED A1C: CPT

## 2019-04-27 PROCEDURE — 36415 COLL VENOUS BLD VENIPUNCTURE: CPT

## 2019-04-27 PROCEDURE — 94760 N-INVAS EAR/PLS OXIMETRY 1: CPT

## 2019-04-27 RX ORDER — PRIMIDONE 50 MG/1
100 TABLET ORAL 2 TIMES DAILY
Status: DISCONTINUED | OUTPATIENT
Start: 2019-04-27 | End: 2019-04-29 | Stop reason: HOSPADM

## 2019-04-27 RX ORDER — LORAZEPAM 0.5 MG/1
2 TABLET ORAL EVERY 6 HOURS PRN
Status: DISCONTINUED | OUTPATIENT
Start: 2019-04-27 | End: 2019-04-29

## 2019-04-27 RX ORDER — ZONISAMIDE 100 MG/1
200 CAPSULE ORAL 2 TIMES DAILY
Status: DISCONTINUED | OUTPATIENT
Start: 2019-04-27 | End: 2019-04-29 | Stop reason: HOSPADM

## 2019-04-27 RX ORDER — ZONISAMIDE 25 MG/1
50 CAPSULE ORAL 2 TIMES DAILY
Status: DISCONTINUED | OUTPATIENT
Start: 2019-04-27 | End: 2019-04-27

## 2019-04-27 RX ORDER — FLUMAZENIL 0.1 MG/ML
0.2 INJECTION INTRAVENOUS ONCE
Status: DISCONTINUED | OUTPATIENT
Start: 2019-04-27 | End: 2019-04-27

## 2019-04-27 RX ADMIN — PREGABALIN 50 MG: 50 CAPSULE ORAL at 12:04

## 2019-04-27 RX ADMIN — SODIUM CHLORIDE 500 ML: 0.9 INJECTION, SOLUTION INTRAVENOUS at 07:04

## 2019-04-27 RX ADMIN — ACETAMINOPHEN 325 MG: 325 TABLET, FILM COATED ORAL at 04:04

## 2019-04-27 RX ADMIN — PIPERACILLIN AND TAZOBACTAM 4.5 G: 4; .5 INJECTION, POWDER, LYOPHILIZED, FOR SOLUTION INTRAVENOUS; PARENTERAL at 10:04

## 2019-04-27 RX ADMIN — INSULIN ASPART 2 UNITS: 100 INJECTION, SOLUTION INTRAVENOUS; SUBCUTANEOUS at 09:04

## 2019-04-27 RX ADMIN — ZONISAMIDE 50 MG: 25 CAPSULE ORAL at 10:04

## 2019-04-27 RX ADMIN — PIPERACILLIN AND TAZOBACTAM 4.5 G: 4; .5 INJECTION, POWDER, LYOPHILIZED, FOR SOLUTION INTRAVENOUS; PARENTERAL at 06:04

## 2019-04-27 RX ADMIN — PRAVASTATIN SODIUM 80 MG: 40 TABLET ORAL at 09:04

## 2019-04-27 RX ADMIN — LORAZEPAM 2 MG: 0.5 TABLET ORAL at 09:04

## 2019-04-27 RX ADMIN — PRIMIDONE 100 MG: 50 TABLET ORAL at 09:04

## 2019-04-27 RX ADMIN — SODIUM CHLORIDE: 0.9 INJECTION, SOLUTION INTRAVENOUS at 02:04

## 2019-04-27 RX ADMIN — INSULIN DETEMIR 10 UNITS: 100 INJECTION, SOLUTION SUBCUTANEOUS at 09:04

## 2019-04-27 RX ADMIN — INSULIN ASPART 4 UNITS: 100 INJECTION, SOLUTION INTRAVENOUS; SUBCUTANEOUS at 12:04

## 2019-04-27 RX ADMIN — INSULIN ASPART 2 UNITS: 100 INJECTION, SOLUTION INTRAVENOUS; SUBCUTANEOUS at 10:04

## 2019-04-27 RX ADMIN — ZONISAMIDE 200 MG: 100 CAPSULE ORAL at 09:04

## 2019-04-27 RX ADMIN — ENOXAPARIN SODIUM 40 MG: 100 INJECTION SUBCUTANEOUS at 05:04

## 2019-04-27 RX ADMIN — ACETAMINOPHEN 325 MG: 325 TABLET, FILM COATED ORAL at 06:04

## 2019-04-27 RX ADMIN — PIPERACILLIN AND TAZOBACTAM 4.5 G: 4; .5 INJECTION, POWDER, LYOPHILIZED, FOR SOLUTION INTRAVENOUS; PARENTERAL at 04:04

## 2019-04-27 RX ADMIN — PRIMIDONE 100 MG: 50 TABLET ORAL at 12:04

## 2019-04-27 RX ADMIN — ACETAMINOPHEN 325 MG: 325 TABLET, FILM COATED ORAL at 10:04

## 2019-04-27 NOTE — NURSING TRANSFER
Nursing Transfer Note      4/27/2019     Transfer To: 304    Transfer via bed    Transfer with cardiac monitoring    Transported by RN and PCT    Medicines sent: Yes    Chart send with patient: Yes    Patient reassessed at: 4/27/19 1335    Upon arrival to floor: patient oriented to room, call bell in reach and bed in lowest position

## 2019-04-27 NOTE — ASSESSMENT & PLAN NOTE
Patient has complicated epilepsy symptoms and has had a long history of difficult to control seizures.  Consult to Neurology for opinion regarding which an epilepsy edicines to continue - due to the patient's increased somnolence will discontinue Lyrica at this time and hold Mysoline and zonisamide until Neurology given opinion  -she reports that the lorazepam she takes 3 times a day was ordered by her neurologist to morocho off symptoms of epilepsy therefore I am uncomfortable with stopping this dose at this time consult to neuro        Patient does not know name of neurologist.  Presenting symptoms appear to be due to narcotics and infection.  These or provoking factors for seizure. .  Will need to speak with son Marco for details of today's event.  Patient no longer Keppra.  Continue zonisamide 200 b.i.d. seizure and aspiration precaution.  Neuro check.

## 2019-04-27 NOTE — ASSESSMENT & PLAN NOTE
Lab Results   Component Value Date    HGBA1C 8.0 (H) 07/30/2016   Obtain hgbA1c. Prandal and SSI and adjust accordingly once eating.

## 2019-04-27 NOTE — ASSESSMENT & PLAN NOTE
ARUNA possible due to prerenal-dehydration and hypotension with concurrent use of lisinopril.   History of ARUNA requiring hemodialysis back in 2016. Serum creatinine 1.2 in August 2016.  UA 2+ protein may be due to infection?. Denies NSAID. Obtain U PCR.  Hold lisinopril. Gentle IVF.   Will need to go over med list with Son and Patient in am.

## 2019-04-27 NOTE — ASSESSMENT & PLAN NOTE
Suspect aspiration and the patient's family report that she is very sleepy and difficult to arouse for 3 days.  Continue current regimen for now and and narrow treatment to Augmentin at the time of discharge    Chest X x-ray showed opacity lower lung base. (I am unable to view in epic at this time).  Continue vancomycin and Zosyn and deescalate to Augmentin when appropriate for both pneumonia and UTI.  Follow up on blood culture.

## 2019-04-27 NOTE — NURSING
Patients blood pressure remains 86/48 in right arm manual and 88/50 left arm manual after receiving 400 ml of 500 ml bolus. Patient ill appearing. Notified Jane Doran NP. NP to see patient.    11:33- Patients bolus completed. IV fluids continuing to infusing at ordered rate. Patient more awake and alert. BP now 92/55. Will continue to monitor

## 2019-04-27 NOTE — NURSING TRANSFER
Nursing Transfer Note      4/27/2019     Transfer From: [facility]    Transfer via bed    Transfer with cardiac monitoring    Transported by RN and tech    Medicines sent: yes    Chart send with patient: Yes    Notified: son    Upon arrival to floor: cardiac monitor applied, patient oriented to room, call bell in reach and bed in lowest position

## 2019-04-27 NOTE — PLAN OF CARE
04/27/19 1406   Discharge Assessment   Assessment Type Discharge Planning Assessment   Assessment information obtained from? Medical Record   Prior to hospitilization cognitive status: Alert/Oriented   Prior to hospitalization functional status: Independent;Assistive Equipment;Needs Assistance   Current cognitive status: Alert/Oriented   Current Functional Status: Independent;Assistive Equipment;Needs Assistance   Facility Arrived From: HOME   Lives With child(maribel), dependent;child(maribel), adult;spouse   Able to Return to Prior Arrangements yes   Is patient able to care for self after discharge? Yes   Who are your caregiver(s) and their phone number(s)? MARCO [phone]   Patient's perception of discharge disposition   (tbd)   Readmission Within the Last 30 Days no previous admission in last 30 days   Patient currently being followed by outpatient case management? No   Patient currently receives any other outside agency services? No   Equipment Currently Used at Home walker, standard;wheelchair   Do you have any problems affording any of your prescribed medications? No   Is the patient taking medications as prescribed? yes   Does the patient have transportation home? Yes   Transportation Anticipated family or friend will provide   Does the patient receive services at the [facility]? No   Discharge Plan A Home with family   Discharge Plan B Home with family   DME Needed Upon Discharge    (TBD)   Patient/Family in Agreement with Plan yes     [id number] - [place] [address] [facility]  [address] [facility] [address]  [place] [address]  Phone: [phone] Fax: [phone]

## 2019-04-27 NOTE — NURSING
Pt received from observation unit via bed. In no apparent distress. Denies any pain. Respirations even non-labored.

## 2019-04-27 NOTE — PROGRESS NOTES
"[facility] Hospital Medicine  Progress Note    Patient Name: Carolyn Mace  MRN: [id number]  Patient Class: OP- Observation   Admission Date: 4/26/2019  Length of Stay: 0 days  Attending Physician: Wanda Steen MD  Primary Care Provider: Franklin Chowdary MD        Subjective:     Principal Problem:Hypovolemia    HPI:  Mrs. Mace is a 58 yo female with significant history for hypertension, epilepsy, diabetes, and chronic back/knee pain, and opiod dependencywho was brought to hospital via EMS for "in and out of consciousness" x3 days.  Patient is not able to provide history as very sleepy.  I am unable to get in touch with son Marco [phone] or [phone] or [phone].  Per ED, Per EMS, the pt's son reported the pt has been "in and out of consciousness." EMS reports an initial CBG of 301 mg/dL, BP of 90/56mmHg, pinpoint pupils and slurred speech. EMS administered a 350cc saline Bolus and 1mg of narcan which improved her slurred speech and respiratory rate. Additionally, the pt also reports urinary incontinence, dysuria, suprapubic, diarrhea and chills. The pt denies fever, chills, diaphoresis, nausea, emesis, abdominal pain, chest pain, cough, otalgia, sore throat and rash. She does not smoke cigarettes, consume EtOH or use illicit drugs. She does take Oxycontin 2mg x4/day for her chronic knee pain.  In ED, .   troponin less than 0.006.  BNP 34.  Lactate 0.8.  Procalcitonin 0.32.  sCr 1.8 (baseline ).  Urine showed 2+ protein, ketone trace, occult blood 1+, WBC more than 100, few bacteria.  Urine and blood culture collected in ED.  Influenza negative.  Chest x-ray showed bilateral airspace opacity.  Low-grade temp 99.6° Lowest blood pressure in ED 87/53.  Blood pressure improved with IV fluid 123/66.  (I cannot view in epic at this time). Zosyn and vancomycin given in ED.     Hospital Course:  This is a 59-year-old white female with history of " difficult-to-control epilepsy, depression from her son  1 year ago, and the below diagnosis that are significant to her admission.  She presented after 2-3 days of some null it is reported by her family.  She was found to have urinary tract infection and signs and symptoms of sepsis with elevated prolactin level and hypotension as well as altered mental status.  Her blood pressure in early morning dropped to 80s in sustained this after 500 cc bolus IV fluid.  She received a 2nd bolus and was continued on 150 cc IV fluids.  Her medication has been adjusted for epilepsy however this is a fragile subject as the patient has had longstanding difficult-to-control epilepsy.  She also takes other medications that could cause somnolence however patient was refractory to Narcan at the time of presentation which likely excludes narcotic induced.  Patient was started on vancomycin and Zosyn combination to cover aspiration pneumonia-this should be sufficient to cover her urinary tract infection at this time awaiting ID and sensitivity.  Culture already resulted presumptive UTI with greater than 100K negative rods.      Monitor patient closely continue IV fluids, continue dual antibiotics, consult Neurology for opinion regarding restarting her epilepsy regiment, discontinue all blood pressure medicines at this time secondary to hyper hypotension    Past Medical History:   Diagnosis Date    Diabetes mellitus     Epilepsy     Hypertension     Osteoporosis        Past Surgical History:   Procedure Laterality Date    ABDOMINAL SURGERY      BREAST SURGERY      CHOLECYSTECTOMY         Review of patient's allergies indicates:   Allergen Reactions    Tegretol [carbamazepine]        No current facility-administered medications on file prior to encounter.      Current Outpatient Medications on File Prior to Encounter   Medication Sig    amLODIPine (NORVASC) 10 MG tablet Take 10 mg by mouth once daily.    DULoxetine (CYMBALTA)  60 MG capsule Take 60 mg by mouth once daily.    hydrALAZINE (APRESOLINE) 25 MG tablet Take 3 tablets (75 mg total) by mouth every 8 (eight) hours.    lisinopril (PRINIVIL,ZESTRIL) 40 MG tablet Take 40 mg by mouth once daily.    metoprolol tartrate (LOPRESSOR) 25 MG tablet Take 25 mg by mouth 2 (two) times daily.    pravastatin (PRAVACHOL) 40 MG tablet Take 80 mg by mouth every evening.     traZODone (DESYREL) 100 MG tablet Take 100 mg by mouth every evening.    zonisamide (ZONEGRAN) 100 MG Cap Take 200 mg by mouth 2 (two) times daily.    acetaminophen (TYLENOL) 325 MG tablet Take 325 mg by mouth every 6 (six) hours as needed for Pain.    acyclovir (ZOVIRAX) 200 MG capsule Take by mouth every 4 (four) hours while awake.    cholecalciferol, vitamin D3, 4,000 unit Cap Take by mouth.    cyanocobalamin (VITAMIN B-12) 1000 MCG tablet Take 100 mcg by mouth once daily.    diltiazem (CARDIZEM CD) 240 MG 24 hr capsule Take 1 capsule (240 mg total) by mouth once daily.    insulin glargine (LANTUS) 100 unit/mL injection Inject 20 Units into the skin every evening.    levetiracetam oral soln 500 mg/5 mL (5 mL) Soln Take 7.5 mLs (750 mg total) by mouth 2 (two) times daily.    lorazepam (ATIVAN) 2 MG Tab Take 1 tablet (2 mg total) by mouth every 6 (six) hours as needed (every 8 hours).    mirtazapine (REMERON) 15 MG tablet Take 7.5 mg by mouth every evening.    ondansetron (ZOFRAN) 4 MG tablet Take 1 tablet (4 mg total) by mouth every 6 (six) hours.    oxycodone (OXY-IR) 5 mg Cap Take 5 mg by mouth every 4 (four) hours as needed.    pregabalin (LYRICA) 50 MG capsule Take 50 mg by mouth 3 (three) times daily.    primidone (MYSOLINE) 50 MG Tab Take 2 tablets (100 mg total) by mouth 2 (two) times daily.    sulfamethoxazole-trimethoprim 800-160mg (BACTRIM DS) 800-160 mg Tab Take 1 tablet by mouth every 12 (twelve) hours.     Family History     None        Tobacco Use    Smoking status: Never Smoker    Smokeless  tobacco: Never Used   Substance and Sexual Activity    Alcohol use: No    Drug use: No    Sexual activity: Yes     Review of Systems   Unable to perform ROS: Mental status change   Constitutional: Positive for activity change and fatigue. Negative for appetite change, chills and fever.   HENT: Negative.    Eyes: Negative.    Respiratory: Negative.    Cardiovascular: Negative.    Gastrointestinal: Negative.    Endocrine: Negative.    Genitourinary: Negative.    Musculoskeletal: Positive for back pain and gait problem.        Wheelchair bound   Skin: Negative.    Neurological: Positive for weakness.     Objective:     Vital Signs (Most Recent):  Temp: 97.8 °F (36.6 °C) (04/27/19 1115)  Pulse: 86 (04/27/19 1116)  Resp: 18 (04/27/19 1115)  BP: (!) 92/55 (04/27/19 1116)  SpO2: 96 % (04/27/19 1131) Vital Signs (24h Range):  Temp:  [97.5 °F (36.4 °C)-99.6 °F (37.6 °C)] 97.8 °F (36.6 °C)  Pulse:  [] 86  Resp:  [15-21] 18  SpO2:  [92 %-100 %] 96 %  BP: ()/(41-66) 92/55     Weight: (!) 139.9 kg (308 lb 6.8 oz)  Body mass index is 54.63 kg/m².    Physical Exam   Constitutional: She appears well-developed.   Morbid obese. Easily arouse but falls back asleep. Oriented to time and place but not situation.    HENT:   Head: Atraumatic.   Eyes: EOM are normal.   Neck: Neck supple.   Cardiovascular: Normal rate and regular rhythm.   Murmur heard.  Pulmonary/Chest: Effort normal and breath sounds normal. No respiratory distress.   Abdominal: Soft. She exhibits distension.   Musculoskeletal: Normal range of motion. She exhibits edema (trace BLE).   Neurological:   Somnolence but awakes when name called.    Skin: Skin is warm and dry.         CRANIAL NERVES     CN III, IV, VI   Extraocular motions are normal.        Significant Labs: All pertinent labs within the past 24 hours have been reviewed.    Significant Imaging: I have reviewed and interpreted all pertinent imaging results/findings within the past 24  hours.    Assessment/Plan:      * Hypovolemia  Paged from the nurse to notify me at 7 am of the patient's hypotension and AMS with slurred speech. She had been refractory to narcan in the ED, therefore drugs contributory is doubtful.  Likely sepsis picture related to urinary tract infection plus pneumonia  -bolus 500 cc and her blood pressure remained systolic 80s  -bolus 2nd bag 500 cc and continue IV fluids at 150 cc an hour  -hold all blood pressure medicines for now  -procalcitonin level elevated with grossly abnormal urinalysis  -continue vanc and Zosyn for now re-evaluate when patient's status is more stabilized    Acute cystitis without hematuria  Culture positive for Gram-negative rods >100 K  Continue Zosyn for now until ID and sensitivity  Continue IV fluids  Last Urine culture e coli in 8/2016. Continue Zosyn.  Follow up urine culture.      Pneumonia due to infectious organism  Suspect aspiration and the patient's family report that she is very sleepy and difficult to arouse for 3 days.  Continue current regimen for now and and narrow treatment to Augmentin at the time of discharge    Chest X x-ray showed opacity lower lung base. (I am unable to view in epic at this time).  Continue vancomycin and Zosyn and deescalate to Augmentin when appropriate for both pneumonia and UTI.  Follow up on blood culture.      Epilepsy  Patient has complicated epilepsy symptoms and has had a long history of difficult to control seizures.  Consult to Neurology for opinion regarding which an epilepsy edicines to continue - due to the patient's increased somnolence will discontinue Lyrica at this time and hold Mysoline and zonisamide until Neurology given opinion  -she reports that the lorazepam she takes 3 times a day was ordered by her neurologist to morocho off symptoms of epilepsy therefore I am uncomfortable with stopping this dose at this time consult to neuro        Patient does not know name of neurologist.  Presenting  symptoms appear to be due to narcotics and infection.  These or provoking factors for seizure. .  Will need to speak with son Marco for details of today's event.  Patient no longer Keppra.  Continue zonisamide 200 b.i.d. seizure and aspiration precaution.  Neuro check.      Acute encephalopathy  Multifactorial - UTI, Aspiration PNA? , Seizure? -will need to speak to Son Marco in am. Treat UTI and PNA. Obtain CT head due to unclear event.       ARUNA superimposed on CKD (chronic kidney disease) stage 3, GFR 30-59 ml/min  ARUNA possible due to prerenal-dehydration and hypotension with concurrent use of lisinopril.   History of ARUNA requiring hemodialysis back in 2016. Serum creatinine 1.2 in August 2016.  UA 2+ protein may be due to infection?. Denies NSAID. Obtain U PCR.  Hold lisinopril. Gentle IVF.   Will need to go over med list with Son and Patient in am.       Essential hypertension  Currently hypotension.  Hold home antihypertensive.      Anemia of chronic disease  Lab similar to previous.  Obtain iron study vitamin B12 folic.      Opioid dependence  Although the patient does have a history of opiate dependency doubt this is the case in this situation of her somnolence.  She does take multiple medicines daily including Lyrica, Percocet, anti convulsions, lorazepam.  Will discontinue Lyrica and Percocet at this time, consult Neurology for opinion regarding her anti convulsions in the rods were pan for her epilepsy  -monitor closely for withdrawal syndromes  -was refractory to Narcan in the emergency room      Patient very sleepy at this time will hold all narcotics.      Type 2 diabetes mellitus  Lab Results   Component Value Date    HGBA1C 8.0 (H) 07/30/2016   Obtain hgbA1c. Prandal and SSI and adjust accordingly once eating.           VTE Risk Mitigation (From admission, onward)        Ordered     enoxaparin injection 40 mg  Daily      04/26/19 1831     IP VTE HIGH RISK PATIENT  Once       04/26/19 1831               DAMEON Gayle, FNP-C  Hospitalist - Department of Hospital Medicine  [address] [address]  Office [phone]; Pager [phone]

## 2019-04-27 NOTE — H&P
"[facility] Hospital Medicine  History & Physical    Patient Name: Carolyn Mace  MRN: [id number]  Admission Date: 4/26/2019  Attending Physician: Wanda Steen MD   Primary Care Provider: Franklin Chowdary MD         Patient information was obtained from patient and ER records.     Subjective:     Principal Problem:Acute cystitis without hematuria    Chief Complaint:   Chief Complaint   Patient presents with    Altered Mental Status     Per EMS, family called for pt being " in and out of conciousness" x 3 days. Family reports pt incontinent and urinating on couch. Pt given 1mg narcan with pupil response. Pt responds to verbal.         HPI: Mrs. Mace is a 60 yo female with significant history for hypertension, epilepsy, diabetes, and chronic back/knee pain, and opiod dependencywho was brought to hospital via EMS for "in and out of consciousness" x3 days.  Patient is not able to provide history as very sleepy.  I am unable to get in touch with son Marco [phone] or [phone] or [phone].  Per ED, Per EMS, the pt's son reported the pt has been "in and out of consciousness." EMS reports an initial CBG of 301 mg/dL, BP of 90/56mmHg, pinpoint pupils and slurred speech. EMS administered a 350cc saline Bolus and 1mg of narcan which improved her slurred speech and respiratory rate. Additionally, the pt also reports urinary incontinence, dysuria, suprapubic, diarrhea and chills. The pt denies fever, chills, diaphoresis, nausea, emesis, abdominal pain, chest pain, cough, otalgia, sore throat and rash. She does not smoke cigarettes, consume EtOH or use illicit drugs. She does take Oxycontin 2mg x4/day for her chronic knee pain.  In ED, .   troponin less than 0.006.  BNP 34.  Lactate 0.8.  Procalcitonin 0.32.  sCr 1.8 (baseline ).  Urine showed 2+ protein, ketone trace, occult blood 1+, WBC more than 100, few bacteria.  Urine and blood culture collected in ED.  " Influenza negative.  Chest x-ray showed bilateral airspace opacity.  Low-grade temp 99.6° Lowest blood pressure in ED 87/53.  Blood pressure improved with IV fluid 123/66.  (I cannot view in epic at this time). Zosyn and vancomycin given in ED.     Past Medical History:   Diagnosis Date    Diabetes mellitus     Epilepsy     Hypertension     Osteoporosis        Past Surgical History:   Procedure Laterality Date    ABDOMINAL SURGERY      BREAST SURGERY      CHOLECYSTECTOMY         Review of patient's allergies indicates:   Allergen Reactions    Tegretol [carbamazepine]        No current facility-administered medications on file prior to encounter.      Current Outpatient Medications on File Prior to Encounter   Medication Sig    amLODIPine (NORVASC) 10 MG tablet Take 10 mg by mouth once daily.    DULoxetine (CYMBALTA) 60 MG capsule Take 60 mg by mouth once daily.    hydrALAZINE (APRESOLINE) 25 MG tablet Take 3 tablets (75 mg total) by mouth every 8 (eight) hours.    lisinopril (PRINIVIL,ZESTRIL) 40 MG tablet Take 40 mg by mouth once daily.    metoprolol tartrate (LOPRESSOR) 25 MG tablet Take 25 mg by mouth 2 (two) times daily.    pravastatin (PRAVACHOL) 40 MG tablet Take 80 mg by mouth every evening.     traZODone (DESYREL) 100 MG tablet Take 100 mg by mouth every evening.    zonisamide (ZONEGRAN) 100 MG Cap Take 200 mg by mouth 2 (two) times daily.    acetaminophen (TYLENOL) 325 MG tablet Take 325 mg by mouth every 6 (six) hours as needed for Pain.    acyclovir (ZOVIRAX) 200 MG capsule Take by mouth every 4 (four) hours while awake.    cholecalciferol, vitamin D3, 4,000 unit Cap Take by mouth.    cyanocobalamin (VITAMIN B-12) 1000 MCG tablet Take 100 mcg by mouth once daily.    diltiazem (CARDIZEM CD) 240 MG 24 hr capsule Take 1 capsule (240 mg total) by mouth once daily.    insulin glargine (LANTUS) 100 unit/mL injection Inject 20 Units into the skin every evening.    levetiracetam oral soln  500 mg/5 mL (5 mL) Soln Take 7.5 mLs (750 mg total) by mouth 2 (two) times daily.    lorazepam (ATIVAN) 2 MG Tab Take 1 tablet (2 mg total) by mouth every 6 (six) hours as needed (every 8 hours).    mirtazapine (REMERON) 15 MG tablet Take 7.5 mg by mouth every evening.    ondansetron (ZOFRAN) 4 MG tablet Take 1 tablet (4 mg total) by mouth every 6 (six) hours.    oxycodone (OXY-IR) 5 mg Cap Take 5 mg by mouth every 4 (four) hours as needed.    pregabalin (LYRICA) 50 MG capsule Take 50 mg by mouth 3 (three) times daily.    primidone (MYSOLINE) 50 MG Tab Take 2 tablets (100 mg total) by mouth 2 (two) times daily.    sulfamethoxazole-trimethoprim 800-160mg (BACTRIM DS) 800-160 mg Tab Take 1 tablet by mouth every 12 (twelve) hours.     Family History     None        Tobacco Use    Smoking status: Never Smoker   Substance and Sexual Activity    Alcohol use: No    Drug use: No    Sexual activity: Not on file     Review of Systems   Unable to perform ROS: Mental status change   Constitutional: Positive for activity change and fatigue. Negative for appetite change, chills and fever.   HENT: Negative.    Eyes: Negative.    Respiratory: Negative.    Cardiovascular: Negative.    Gastrointestinal: Negative.    Endocrine: Negative.    Genitourinary: Negative.    Musculoskeletal: Positive for back pain and gait problem.        Wheelchair bound   Skin: Negative.    Neurological: Positive for weakness.     Objective:     Vital Signs (Most Recent):  Temp: 98 °F (36.7 °C) (04/26/19 1941)  Pulse: 97 (04/26/19 1941)  Resp: 20 (04/26/19 1941)  BP: 123/66 (04/26/19 1941)  SpO2: 98 % (04/26/19 1941) Vital Signs (24h Range):  Temp:  [98 °F (36.7 °C)-99.6 °F (37.6 °C)] 98 °F (36.7 °C)  Pulse:  [] 97  Resp:  [15-21] 20  SpO2:  [97 %-100 %] 98 %  BP: ()/(53-66) 123/66     Weight: (!) 139.9 kg (308 lb 6.8 oz)  Body mass index is 54.63 kg/m².    Physical Exam   Constitutional: She appears well-developed.   Morbid obese.  Easily arouse but falls back asleep. Oriented to time and place but not situation.    HENT:   Head: Atraumatic.   Eyes: EOM are normal.   Neck: Neck supple.   Cardiovascular: Normal rate and regular rhythm.   Murmur heard.  Pulmonary/Chest: Effort normal and breath sounds normal. No respiratory distress.   Abdominal: Soft. She exhibits distension.   Musculoskeletal: Normal range of motion. She exhibits edema (trace BLE).   Neurological:   Somnolence but awakes when name called.    Skin: Skin is warm and dry.         CRANIAL NERVES     CN III, IV, VI   Extraocular motions are normal.        Significant Labs: All pertinent labs within the past 24 hours have been reviewed.    Significant Imaging: I have reviewed and interpreted all pertinent imaging results/findings within the past 24 hours.    Assessment/Plan:     * Acute cystitis without hematuria  Last Urine culture e coli in 8/2016. Continue Zosyn.  Follow up urine culture.      Pneumonia due to infectious organism  Chest X x-ray showed opacity lower lung base. (I am unable to view in epic at this time).  Continue vancomycin and Zosyn and deescalate to Augmentin when appropriate for both pneumonia and UTI.  Follow up on blood culture.      ARUNA superimposed on CKD (chronic kidney disease) stage 3, GFR 30-59 ml/min  ARUNA possible due to prerenal-dehydration and hypotension with concurrent use of lisinopril.   History of ARUNA requiring hemodialysis back in 2016. Serum creatinine 1.2 in August 2016.  UA 2+ protein may be due to infection?. Denies NSAID. Obtain U PCR.  Hold lisinopril. Gentle IVF.   Will need to go over med list with Son and Patient in am.       Acute encephalopathy  Multifactorial - UTI, Aspiration PNA? , Seizure? -will need to speak to Son Marco in am. Treat UTI and PNA. Obtain CT head due to unclear event.       Essential hypertension  Currently hypotension.  Hold home antihypertensive.      Anemia of chronic disease  Lab similar to previous.   Obtain iron study vitamin B12 folic.      Opioid dependence  Patient very sleepy at this time will hold all narcotics.      Epilepsy  Patient does not know name of neurologist.  Presenting symptoms appear to be due to narcotics and infection.  These or provoking factors for seizure. .  Will need to speak with son Marco for details of today's event.  Patient no longer Keppra.  Continue zonisamide 200 b.i.d. seizure and aspiration precaution.  Neuro check.      Type 2 diabetes mellitus  Lab Results   Component Value Date    HGBA1C 8.0 (H) 07/30/2016   Obtain hgbA1c. Prandal and SSI and adjust accordingly once eating.         VTE Risk Mitigation (From admission, onward)        Ordered     enoxaparin injection 40 mg  Daily      04/26/19 1831     IP VTE HIGH RISK PATIENT  Once      04/26/19 1831             Tabby Liao NP  Department of Hospital Medicine   [facility]

## 2019-04-27 NOTE — ASSESSMENT & PLAN NOTE
Multifactorial - UTI, Aspiration PNA? , Seizure? -will need to speak to Son Marco in am. Treat UTI and PNA. Obtain CT head due to unclear event.

## 2019-04-27 NOTE — PLAN OF CARE
Problem: Adult Inpatient Plan of Care  Goal: Plan of Care Review  Outcome: Ongoing (interventions implemented as appropriate)     04/27/19 0723   Plan of Care Review   Plan of Care Reviewed With patient   Progress no change       Problem: Diabetes Comorbidity  Goal: Blood Glucose Level Within Desired Range    Intervention: Maintain Glycemic Control     04/27/19 0723   Monitor and Manage Ketoacidosis   Glycemic Management blood glucose monitoring;oral hydration promoted

## 2019-04-27 NOTE — NURSING
Report received from SREEDHAR Rodríguez. Patient resting in bed. Complains of pain in back and knees unrelieved by PRN tylenol, also complains of feeling slightly dizzy. IV fluids infusing at 100 ml/hr, 2 L N/C in use. Patient is sleepy but awakens easily to voice, is able to answer orientation questions correctly. Saucedo to gravity in place with 200 ml clear yellow urine noted. Saucedo being removed by previous shift RN at this time.    Blood pressure noted to be 80/40's. Notified CÉSAR Lara. New orders for IV bolus Normal saline 500 ml. Bolus hung.

## 2019-04-27 NOTE — SUBJECTIVE & OBJECTIVE
Past Medical History:   Diagnosis Date    Diabetes mellitus     Epilepsy     Hypertension     Osteoporosis        Past Surgical History:   Procedure Laterality Date    ABDOMINAL SURGERY      BREAST SURGERY      CHOLECYSTECTOMY         Review of patient's allergies indicates:   Allergen Reactions    Tegretol [carbamazepine]        No current facility-administered medications on file prior to encounter.      Current Outpatient Medications on File Prior to Encounter   Medication Sig    amLODIPine (NORVASC) 10 MG tablet Take 10 mg by mouth once daily.    DULoxetine (CYMBALTA) 60 MG capsule Take 60 mg by mouth once daily.    hydrALAZINE (APRESOLINE) 25 MG tablet Take 3 tablets (75 mg total) by mouth every 8 (eight) hours.    lisinopril (PRINIVIL,ZESTRIL) 40 MG tablet Take 40 mg by mouth once daily.    metoprolol tartrate (LOPRESSOR) 25 MG tablet Take 25 mg by mouth 2 (two) times daily.    pravastatin (PRAVACHOL) 40 MG tablet Take 80 mg by mouth every evening.     traZODone (DESYREL) 100 MG tablet Take 100 mg by mouth every evening.    zonisamide (ZONEGRAN) 100 MG Cap Take 200 mg by mouth 2 (two) times daily.    acetaminophen (TYLENOL) 325 MG tablet Take 325 mg by mouth every 6 (six) hours as needed for Pain.    acyclovir (ZOVIRAX) 200 MG capsule Take by mouth every 4 (four) hours while awake.    cholecalciferol, vitamin D3, 4,000 unit Cap Take by mouth.    cyanocobalamin (VITAMIN B-12) 1000 MCG tablet Take 100 mcg by mouth once daily.    diltiazem (CARDIZEM CD) 240 MG 24 hr capsule Take 1 capsule (240 mg total) by mouth once daily.    insulin glargine (LANTUS) 100 unit/mL injection Inject 20 Units into the skin every evening.    levetiracetam oral soln 500 mg/5 mL (5 mL) Soln Take 7.5 mLs (750 mg total) by mouth 2 (two) times daily.    lorazepam (ATIVAN) 2 MG Tab Take 1 tablet (2 mg total) by mouth every 6 (six) hours as needed (every 8 hours).    mirtazapine (REMERON) 15 MG tablet Take 7.5 mg by  mouth every evening.    ondansetron (ZOFRAN) 4 MG tablet Take 1 tablet (4 mg total) by mouth every 6 (six) hours.    oxycodone (OXY-IR) 5 mg Cap Take 5 mg by mouth every 4 (four) hours as needed.    pregabalin (LYRICA) 50 MG capsule Take 50 mg by mouth 3 (three) times daily.    primidone (MYSOLINE) 50 MG Tab Take 2 tablets (100 mg total) by mouth 2 (two) times daily.    sulfamethoxazole-trimethoprim 800-160mg (BACTRIM DS) 800-160 mg Tab Take 1 tablet by mouth every 12 (twelve) hours.     Family History     None        Tobacco Use    Smoking status: Never Smoker   Substance and Sexual Activity    Alcohol use: No    Drug use: No    Sexual activity: Not on file     Review of Systems   Unable to perform ROS: Mental status change   Constitutional: Positive for activity change and fatigue. Negative for appetite change, chills and fever.   HENT: Negative.    Eyes: Negative.    Respiratory: Negative.    Cardiovascular: Negative.    Gastrointestinal: Negative.    Endocrine: Negative.    Genitourinary: Negative.    Musculoskeletal: Positive for back pain and gait problem.        Wheelchair bound   Skin: Negative.    Neurological: Positive for weakness.     Objective:     Vital Signs (Most Recent):  Temp: 98 °F (36.7 °C) (04/26/19 1941)  Pulse: 97 (04/26/19 1941)  Resp: 20 (04/26/19 1941)  BP: 123/66 (04/26/19 1941)  SpO2: 98 % (04/26/19 1941) Vital Signs (24h Range):  Temp:  [98 °F (36.7 °C)-99.6 °F (37.6 °C)] 98 °F (36.7 °C)  Pulse:  [] 97  Resp:  [15-21] 20  SpO2:  [97 %-100 %] 98 %  BP: ()/(53-66) 123/66     Weight: (!) 139.9 kg (308 lb 6.8 oz)  Body mass index is 54.63 kg/m².    Physical Exam   Constitutional: She appears well-developed.   Morbid obese. Easily arouse but falls back asleep. Oriented to time and place but not situation.    HENT:   Head: Atraumatic.   Eyes: EOM are normal.   Neck: Neck supple.   Cardiovascular: Normal rate and regular rhythm.   Murmur heard.  Pulmonary/Chest: Effort  normal and breath sounds normal. No respiratory distress.   Abdominal: Soft. She exhibits distension.   Musculoskeletal: Normal range of motion. She exhibits edema (trace BLE).   Neurological:   Somnolence but awakes when name called.    Skin: Skin is warm and dry.         CRANIAL NERVES     CN III, IV, VI   Extraocular motions are normal.        Significant Labs: All pertinent labs within the past 24 hours have been reviewed.    Significant Imaging: I have reviewed and interpreted all pertinent imaging results/findings within the past 24 hours.

## 2019-04-27 NOTE — ASSESSMENT & PLAN NOTE
Culture positive for Gram-negative rods >100 K  Continue Zosyn for now until ID and sensitivity  Continue IV fluids  Last Urine culture e coli in 8/2016. Continue Zosyn.  Follow up urine culture.

## 2019-04-27 NOTE — ASSESSMENT & PLAN NOTE
Although the patient does have a history of opiate dependency doubt this is the case in this situation of her somnolence.  She does take multiple medicines daily including Lyrica, Percocet, anti convulsions, lorazepam.  Will discontinue Lyrica and Percocet at this time, consult Neurology for opinion regarding her anti convulsions in the rods were pan for her epilepsy  -monitor closely for withdrawal syndromes  -was refractory to Narcan in the emergency room      Patient very sleepy at this time will hold all narcotics.

## 2019-04-27 NOTE — ASSESSMENT & PLAN NOTE
Chest X x-ray showed opacity lower lung base. (I am unable to view in epic at this time).  Continue vancomycin and Zosyn and deescalate to Augmentin when appropriate for both pneumonia and UTI.  Follow up on blood culture.

## 2019-04-27 NOTE — SUBJECTIVE & OBJECTIVE
Past Medical History:   Diagnosis Date    Diabetes mellitus     Epilepsy     Hypertension     Osteoporosis        Past Surgical History:   Procedure Laterality Date    ABDOMINAL SURGERY      BREAST SURGERY      CHOLECYSTECTOMY         Review of patient's allergies indicates:   Allergen Reactions    Tegretol [carbamazepine]        No current facility-administered medications on file prior to encounter.      Current Outpatient Medications on File Prior to Encounter   Medication Sig    amLODIPine (NORVASC) 10 MG tablet Take 10 mg by mouth once daily.    DULoxetine (CYMBALTA) 60 MG capsule Take 60 mg by mouth once daily.    hydrALAZINE (APRESOLINE) 25 MG tablet Take 3 tablets (75 mg total) by mouth every 8 (eight) hours.    lisinopril (PRINIVIL,ZESTRIL) 40 MG tablet Take 40 mg by mouth once daily.    metoprolol tartrate (LOPRESSOR) 25 MG tablet Take 25 mg by mouth 2 (two) times daily.    pravastatin (PRAVACHOL) 40 MG tablet Take 80 mg by mouth every evening.     traZODone (DESYREL) 100 MG tablet Take 100 mg by mouth every evening.    zonisamide (ZONEGRAN) 100 MG Cap Take 200 mg by mouth 2 (two) times daily.    acetaminophen (TYLENOL) 325 MG tablet Take 325 mg by mouth every 6 (six) hours as needed for Pain.    acyclovir (ZOVIRAX) 200 MG capsule Take by mouth every 4 (four) hours while awake.    cholecalciferol, vitamin D3, 4,000 unit Cap Take by mouth.    cyanocobalamin (VITAMIN B-12) 1000 MCG tablet Take 100 mcg by mouth once daily.    diltiazem (CARDIZEM CD) 240 MG 24 hr capsule Take 1 capsule (240 mg total) by mouth once daily.    insulin glargine (LANTUS) 100 unit/mL injection Inject 20 Units into the skin every evening.    levetiracetam oral soln 500 mg/5 mL (5 mL) Soln Take 7.5 mLs (750 mg total) by mouth 2 (two) times daily.    lorazepam (ATIVAN) 2 MG Tab Take 1 tablet (2 mg total) by mouth every 6 (six) hours as needed (every 8 hours).    mirtazapine (REMERON) 15 MG tablet Take 7.5 mg by  mouth every evening.    ondansetron (ZOFRAN) 4 MG tablet Take 1 tablet (4 mg total) by mouth every 6 (six) hours.    oxycodone (OXY-IR) 5 mg Cap Take 5 mg by mouth every 4 (four) hours as needed.    pregabalin (LYRICA) 50 MG capsule Take 50 mg by mouth 3 (three) times daily.    primidone (MYSOLINE) 50 MG Tab Take 2 tablets (100 mg total) by mouth 2 (two) times daily.    sulfamethoxazole-trimethoprim 800-160mg (BACTRIM DS) 800-160 mg Tab Take 1 tablet by mouth every 12 (twelve) hours.     Family History     None        Tobacco Use    Smoking status: Never Smoker    Smokeless tobacco: Never Used   Substance and Sexual Activity    Alcohol use: No    Drug use: No    Sexual activity: Yes     Review of Systems   Unable to perform ROS: Mental status change   Constitutional: Positive for activity change and fatigue. Negative for appetite change, chills and fever.   HENT: Negative.    Eyes: Negative.    Respiratory: Negative.    Cardiovascular: Negative.    Gastrointestinal: Negative.    Endocrine: Negative.    Genitourinary: Negative.    Musculoskeletal: Positive for back pain and gait problem.        Wheelchair bound   Skin: Negative.    Neurological: Positive for weakness.     Objective:     Vital Signs (Most Recent):  Temp: 97.8 °F (36.6 °C) (04/27/19 1115)  Pulse: 86 (04/27/19 1116)  Resp: 18 (04/27/19 1115)  BP: (!) 92/55 (04/27/19 1116)  SpO2: 96 % (04/27/19 1131) Vital Signs (24h Range):  Temp:  [97.5 °F (36.4 °C)-99.6 °F (37.6 °C)] 97.8 °F (36.6 °C)  Pulse:  [] 86  Resp:  [15-21] 18  SpO2:  [92 %-100 %] 96 %  BP: ()/(41-66) 92/55     Weight: (!) 139.9 kg (308 lb 6.8 oz)  Body mass index is 54.63 kg/m².    Physical Exam   Constitutional: She appears well-developed.   Morbid obese. Easily arouse but falls back asleep. Oriented to time and place but not situation.    HENT:   Head: Atraumatic.   Eyes: EOM are normal.   Neck: Neck supple.   Cardiovascular: Normal rate and regular rhythm.   Murmur  heard.  Pulmonary/Chest: Effort normal and breath sounds normal. No respiratory distress.   Abdominal: Soft. She exhibits distension.   Musculoskeletal: Normal range of motion. She exhibits edema (trace BLE).   Neurological:   Somnolence but awakes when name called.    Skin: Skin is warm and dry.         CRANIAL NERVES     CN III, IV, VI   Extraocular motions are normal.        Significant Labs: All pertinent labs within the past 24 hours have been reviewed.    Significant Imaging: I have reviewed and interpreted all pertinent imaging results/findings within the past 24 hours.

## 2019-04-27 NOTE — CONSULTS
"[facility]  Neurology  Consult Note    Patient Name: Carolyn Mace  MRN: [id number]  Admission Date: 4/26/2019  Hospital Length of Stay: 0 days  Code Status: Full Code   Attending Provider: Wanda Steen MD   Consulting Provider: Harry Gomez MD  Primary Care Physician: Franklin Chowdary MD  Principal Problem:Acute cystitis without hematuria    Consults  Subjective:     Chief Complaint: "I was very sleepy"     HPI: 60 y/o female with medical Hx as listed below was brought to ED after found her to be very lethargic. Upon arrival to ED she was markedly hypotensive (80/40's. Pt was given IV naloxone to no avail. Lab work up showed UTI. Pt has been consulted given her multiple potentially sedating medications which include, primidone, zonisamide, pregabalin, oxycodone, mirtazapine, lorazepam. Ms. Mace has Hx of seizures since age 17; reported to be well-controlled on two AED's. Pt is alert and fully oriented; still hypotensive.    Past Medical History:   Diagnosis Date    Diabetes mellitus     Epilepsy     Hypertension     Osteoporosis        Past Surgical History:   Procedure Laterality Date    ABDOMINAL SURGERY      BREAST SURGERY      CHOLECYSTECTOMY         Review of patient's allergies indicates:   Allergen Reactions    Tegretol [carbamazepine]        Current Neurological Medications:     No current facility-administered medications on file prior to encounter.      Current Outpatient Medications on File Prior to Encounter   Medication Sig    amLODIPine (NORVASC) 10 MG tablet Take 10 mg by mouth once daily.    DULoxetine (CYMBALTA) 60 MG capsule Take 60 mg by mouth once daily.    hydrALAZINE (APRESOLINE) 25 MG tablet Take 3 tablets (75 mg total) by mouth every 8 (eight) hours.    lisinopril (PRINIVIL,ZESTRIL) 40 MG tablet Take 40 mg by mouth once daily.    metoprolol tartrate (LOPRESSOR) 25 MG tablet Take 25 mg by mouth 2 (two) times daily.    pravastatin (PRAVACHOL) 40 " MG tablet Take 80 mg by mouth every evening.     traZODone (DESYREL) 100 MG tablet Take 100 mg by mouth every evening.    zonisamide (ZONEGRAN) 100 MG Cap Take 200 mg by mouth 2 (two) times daily.    acetaminophen (TYLENOL) 325 MG tablet Take 325 mg by mouth every 6 (six) hours as needed for Pain.    acyclovir (ZOVIRAX) 200 MG capsule Take by mouth every 4 (four) hours while awake.    cholecalciferol, vitamin D3, 4,000 unit Cap Take by mouth.    cyanocobalamin (VITAMIN B-12) 1000 MCG tablet Take 100 mcg by mouth once daily.    diltiazem (CARDIZEM CD) 240 MG 24 hr capsule Take 1 capsule (240 mg total) by mouth once daily.    insulin glargine (LANTUS) 100 unit/mL injection Inject 20 Units into the skin every evening.    levetiracetam oral soln 500 mg/5 mL (5 mL) Soln Take 7.5 mLs (750 mg total) by mouth 2 (two) times daily.    lorazepam (ATIVAN) 2 MG Tab Take 1 tablet (2 mg total) by mouth every 6 (six) hours as needed (every 8 hours).    mirtazapine (REMERON) 15 MG tablet Take 7.5 mg by mouth every evening.    ondansetron (ZOFRAN) 4 MG tablet Take 1 tablet (4 mg total) by mouth every 6 (six) hours.    oxycodone (OXY-IR) 5 mg Cap Take 5 mg by mouth every 4 (four) hours as needed.    pregabalin (LYRICA) 50 MG capsule Take 50 mg by mouth 3 (three) times daily.    primidone (MYSOLINE) 50 MG Tab Take 2 tablets (100 mg total) by mouth 2 (two) times daily.    sulfamethoxazole-trimethoprim 800-160mg (BACTRIM DS) 800-160 mg Tab Take 1 tablet by mouth every 12 (twelve) hours.      Family History     None        Tobacco Use    Smoking status: Never Smoker    Smokeless tobacco: Never Used   Substance and Sexual Activity    Alcohol use: No    Drug use: No    Sexual activity: Yes     Review of Systems   Constitutional: Positive for fatigue. Negative for fever.   HENT: Negative for trouble swallowing.    Eyes: Negative for photophobia.   Respiratory: Negative for shortness of breath.    Cardiovascular: Negative  for chest pain.   Gastrointestinal: Negative for abdominal pain.   Musculoskeletal: Negative for neck stiffness.   Neurological: Negative for headaches.   Psychiatric/Behavioral: Negative for hallucinations.     Objective:     Vital Signs (Most Recent):  Temp: 97.8 °F (36.6 °C) (04/27/19 1115)  Pulse: 86 (04/27/19 1116)  Resp: 18 (04/27/19 1115)  BP: (!) 92/55 (04/27/19 1116)  SpO2: (!) 92 % (04/27/19 1115) Vital Signs (24h Range):  Temp:  [97.5 °F (36.4 °C)-99.6 °F (37.6 °C)] 97.8 °F (36.6 °C)  Pulse:  [] 86  Resp:  [15-21] 18  SpO2:  [92 %-100 %] 92 %  BP: ()/(41-66) 92/55     Weight: (!) 139.9 kg (308 lb 6.8 oz)  Body mass index is 54.63 kg/m².    Physical Exam   Constitutional: She is oriented to person, place, and time. No distress.   HENT:   Head: Normocephalic.   Eyes: Right eye exhibits no discharge. Left eye exhibits no discharge.   Cardiovascular: Normal heart sounds.   Pulmonary/Chest: Breath sounds normal.   Abdominal: Soft.   Musculoskeletal: She exhibits edema.   Neurological: She is oriented to person, place, and time.   Skin: She is not diaphoretic.   Psychiatric: Her speech is normal.       NEUROLOGICAL EXAMINATION:     MENTAL STATUS   Oriented to person, place, and time.   Speech: speech is normal   Level of consciousness: alert    CRANIAL NERVES     CN III, IV, VI   Right pupil: Size: 2 mm. Shape: regular.   Left pupil: Size: 2 mm. Shape: regular.   Nystagmus: none   Ophthalmoparesis: none  Conjugate gaze: present    CN V   Right facial sensation deficit: none  Left facial sensation deficit: none    CN VII   Right facial weakness: none  Left facial weakness: none    CN IX, X   Palate: symmetric    CN XI   Right trapezius strength: normal  Left trapezius strength: normal    CN XII   Tongue deviation: none    MOTOR EXAM        5/5 in UE;   Moves LE's in bed but complaints of edema in legs that make her difficult to move LE's     SENSORY EXAM   Right arm light touch: normal  Left arm  light touch: normal      Significant Labs:   CBC:   Recent Labs   Lab 04/26/19  1455 04/27/19  0623   WBC 12.57 9.91   HGB 10.6* 10.3*   HCT 34.0* 33.6*    224     CMP:   Recent Labs   Lab 04/26/19  1455 04/27/19  0616   * 174*   * 137   K 4.1 4.3    108   CO2 28 23   BUN 24* 22   CREATININE 1.8* 1.2   CALCIUM 9.5 8.4*   MG 1.6  --    PROT 7.1  --    ALBUMIN 3.0*  --    BILITOT 0.3  --    ALKPHOS 95  --    AST 10  --    ALT 7*  --    ANIONGAP 5* 6*   EGFRNONAA 30* 49*       Significant Imaging:  Head CT  Impression       No CT evidence of acute intracranial abnormality. Clinical correlation and further evaluation as warranted.      Electronically signed by: Robert Lamar MD  Date: 04/27/2019  Time: 03:11         Assessment and Plan:     58 y/o female consulted for lethargy    1. AMS: encephalopathy due to current UTI but also use of multiple potentially sedating meds (more os with her resolving ARF).   -Given her long Hx of seizures and reported good control with zonisamide and primidone will continue ome dose of meds.   -Would advise on limiting use oxycodone and also on slow weaning of lorazepam. Apparently pt takes lorazepam 2 mg TID; could begin by reducing AM dose to 1 mg.    BP slowly improving but still hypotensive. UTI being treated.    Active Diagnoses:    Diagnosis Date Noted POA    PRINCIPAL PROBLEM:  Acute cystitis without hematuria [N30.00] 04/26/2019 Yes    Hypovolemia [E86.1] 04/27/2019 Yes    Pneumonia due to infectious organism [J18.9] 04/26/2019 Yes    Essential hypertension [I10] 04/26/2019 Yes    ARUNA superimposed on CKD (chronic kidney disease) stage 3, GFR 30-59 ml/min [N18.3] 04/26/2019 Yes    Acute encephalopathy [G93.40] 04/26/2019 Yes    Anemia of chronic disease [D63.8] 07/29/2016 Yes    Type 2 diabetes mellitus [E11.9] 07/16/2016 Yes     Chronic    Epilepsy [G40.909] 07/16/2016 Yes     Chronic    Opioid dependence [F11.20] 07/16/2016 Yes     Chronic       Problems Resolved During this Admission:       VTE Risk Mitigation (From admission, onward)        Ordered     enoxaparin injection 40 mg  Daily      04/26/19 1831     IP VTE HIGH RISK PATIENT  Once      04/26/19 1831          Thank you for your consult. I will follow-up with patient. Please contact us if you have any additional questions.    Harry Gomez MD  Neurology  [facility]

## 2019-04-27 NOTE — ASSESSMENT & PLAN NOTE
Paged from the nurse to notify me at 7 am of the patient's hypotension and AMS with slurred speech. She had been refractory to narcan in the ED, therefore drugs contributory is doubtful.  Likely sepsis picture related to urinary tract infection plus pneumonia  -bolus 500 cc and her blood pressure remained systolic 80s  -bolus 2nd bag 500 cc and continue IV fluids at 150 cc an hour  -hold all blood pressure medicines for now  -procalcitonin level elevated with grossly abnormal urinalysis  -continue vanc and Zosyn for now re-evaluate when patient's status is more stabilized

## 2019-04-27 NOTE — HOSPITAL COURSE
Ms Mace presented with acute encephalopathy. Patient was hypotensive and somnolent. Did not respond to narcan. She did have evidence of UTI and pneumonia. Started on IVFs and empiric antibiotics. Neurology consulted. Patient takes plenty of sedating meds. Resumed zonisamide and primidone for seizures, avoided narcotics and used lorazepam sparingly. Patient's mental status returned to normal. BCx showed NG and urine culture showed > 100,000 cfu/mL of E coli. She was on IV Abx with Zosyn,had ARUNA,which is resolved with IVF,bood pressure remains stable,she was alert time 3,metabolic encephalopathy is resolved,instructed cut back on home BP med;s,narcotics,ativan,insulin,patient has been discharged home with PCP follow up.

## 2019-04-27 NOTE — PLAN OF CARE
Problem: Diabetes Comorbidity  Goal: Blood Glucose Level Within Desired Range  Outcome: Ongoing (interventions implemented as appropriate)  Intervention: Maintain Glycemic Control     04/27/19 1203   Monitor and Manage Ketoacidosis   Glycemic Management blood glucose monitoring;supplemental insulin given         Problem: Skin Injury Risk Increased  Goal: Skin Health and Integrity  Outcome: Ongoing (interventions implemented as appropriate)  Intervention: Optimize Skin Protection     04/27/19 1130 04/27/19 1203   Prevent Additional Skin Injury   Head of Bed (HOB) HOB at 30-45 degrees  --    Pressure Reduction Techniques  --  frequent weight shift encouraged;weight shift assistance provided;sit time limited to 2 hours;positioned off wounds   Monitor and Manage Hypervolemia   Skin Protection  --  adhesive use limited;electrode sites changed;incontinence pads utilized;pouching devices used;tubing/devices free from skin contact     Intervention: Promote and Optimize Oral Intake     04/27/19 1203   Monitor and Manage Anemia   Oral Nutrition Promotion rest periods promoted

## 2019-04-27 NOTE — HPI
"Mrs. Mace is a 60 yo female with significant history for hypertension, epilepsy, diabetes, and chronic back/knee pain, and opiod dependencywho was brought to hospital via EMS for "in and out of consciousness" x3 days.  Patient is not able to provide history as very sleepy.  I am unable to get in touch with son Marco [phone] or [phone] or [phone].  Per ED, Per EMS, the pt's son reported the pt has been "in and out of consciousness." EMS reports an initial CBG of 301 mg/dL, BP of 90/56mmHg, pinpoint pupils and slurred speech. EMS administered a 350cc saline Bolus and 1mg of narcan which improved her slurred speech and respiratory rate. Additionally, the pt also reports urinary incontinence, dysuria, suprapubic, diarrhea and chills. The pt denies fever, chills, diaphoresis, nausea, emesis, abdominal pain, chest pain, cough, otalgia, sore throat and rash. She does not smoke cigarettes, consume EtOH or use illicit drugs. She does take Oxycontin 2mg x4/day for her chronic knee pain.  In ED, .   troponin less than 0.006.  BNP 34.  Lactate 0.8.  Procalcitonin 0.32.  sCr 1.8 (baseline ).  Urine showed 2+ protein, ketone trace, occult blood 1+, WBC more than 100, few bacteria.  Urine and blood culture collected in ED.  Influenza negative.  Chest x-ray showed bilateral airspace opacity.  Low-grade temp 99.6° Lowest blood pressure in ED 87/53.  Blood pressure improved with IV fluid 123/66.  (I cannot view in epic at this time). Zosyn and vancomycin given in ED.   "

## 2019-04-27 NOTE — ASSESSMENT & PLAN NOTE
Patient does not know name of neurologist.  Presenting symptoms appear to be due to narcotics and infection.  These or provoking factors for seizure. .  Will need to speak with son Marco for details of today's event.  Patient no longer Keppra.  Continue zonisamide 200 b.i.d. seizure and aspiration precaution.  Neuro check.

## 2019-04-28 LAB
ANION GAP SERPL CALC-SCNC: 7 MMOL/L (ref 8–16)
AORTIC ROOT ANNULUS: 3.25 CM
AORTIC VALVE CUSP SEPERATION: 2.1 CM
ASCENDING AORTA: 3 CM
AV INDEX (PROSTH): 0.62
AV MEAN GRADIENT: 8.4 MMHG
AV PEAK GRADIENT: 14.59 MMHG
AV VALVE AREA: 1.89 CM2
AV VELOCITY RATIO: 0.62
BACTERIA UR CULT: NORMAL
BASOPHILS # BLD AUTO: 0.05 K/UL (ref 0–0.2)
BASOPHILS NFR BLD: 0.8 % (ref 0–1.9)
BSA FOR ECHO PROCEDURE: 2.54 M2
BUN SERPL-MCNC: 17 MG/DL (ref 8–23)
CALCIUM SERPL-MCNC: 8 MG/DL (ref 8.7–10.5)
CHLORIDE SERPL-SCNC: 110 MMOL/L (ref 95–110)
CO2 SERPL-SCNC: 22 MMOL/L (ref 23–29)
CREAT SERPL-MCNC: 0.9 MG/DL (ref 0.5–1.4)
CV ECHO LV RWT: 0.63 CM
DIFFERENTIAL METHOD: ABNORMAL
DOP CALC AO PEAK VEL: 1.91 M/S
DOP CALC AO VTI: 38.71 CM
DOP CALC LVOT AREA: 3.05 CM2
DOP CALC LVOT DIAMETER: 1.97 CM
DOP CALC LVOT PEAK VEL: 1.19 M/S
DOP CALC LVOT STROKE VOLUME: 73.18 CM3
DOP CALCLVOT PEAK VEL VTI: 24.02 CM
E WAVE DECELERATION TIME: 255.12 MSEC
E/A RATIO: 0.85
E/E' RATIO: 16.27
ECHO LV POSTERIOR WALL: 1.35 CM (ref 0.6–1.1)
EOSINOPHIL # BLD AUTO: 0.1 K/UL (ref 0–0.5)
EOSINOPHIL NFR BLD: 2 % (ref 0–8)
ERYTHROCYTE [DISTWIDTH] IN BLOOD BY AUTOMATED COUNT: 15.6 % (ref 11.5–14.5)
EST. GFR  (AFRICAN AMERICAN): >60 ML/MIN/1.73 M^2
EST. GFR  (NON AFRICAN AMERICAN): >60 ML/MIN/1.73 M^2
FRACTIONAL SHORTENING: 30 % (ref 28–44)
GLUCOSE SERPL-MCNC: 148 MG/DL (ref 70–110)
HCT VFR BLD AUTO: 34.2 % (ref 37–48.5)
HGB BLD-MCNC: 10.6 G/DL (ref 12–16)
INTERVENTRICULAR SEPTUM: 1.35 CM (ref 0.6–1.1)
IVRT: 0.12 MSEC
LA MAJOR: 5.56 CM
LA MINOR: 5.48 CM
LA WIDTH: 4.3 CM
LEFT ATRIUM SIZE: 4.05 CM
LEFT ATRIUM VOLUME INDEX: 34.6 ML/M2
LEFT ATRIUM VOLUME: 81.71 CM3
LEFT INTERNAL DIMENSION IN SYSTOLE: 3.01 CM (ref 2.1–4)
LEFT VENTRICLE DIASTOLIC VOLUME INDEX: 35.42 ML/M2
LEFT VENTRICLE DIASTOLIC VOLUME: 83.63 ML
LEFT VENTRICLE MASS INDEX: 93.4 G/M2
LEFT VENTRICLE SYSTOLIC VOLUME INDEX: 14.9 ML/M2
LEFT VENTRICLE SYSTOLIC VOLUME: 35.3 ML
LEFT VENTRICULAR INTERNAL DIMENSION IN DIASTOLE: 4.31 CM (ref 3.5–6)
LEFT VENTRICULAR MASS: 220.59 G
LV LATERAL E/E' RATIO: 12.2
LV SEPTAL E/E' RATIO: 24.4
LYMPHOCYTES # BLD AUTO: 2.1 K/UL (ref 1–4.8)
LYMPHOCYTES NFR BLD: 32.7 % (ref 18–48)
MCH RBC QN AUTO: 26.3 PG (ref 27–31)
MCHC RBC AUTO-ENTMCNC: 31 G/DL (ref 32–36)
MCV RBC AUTO: 85 FL (ref 82–98)
MONOCYTES # BLD AUTO: 0.5 K/UL (ref 0.3–1)
MONOCYTES NFR BLD: 7.3 % (ref 4–15)
MV PEAK A VEL: 1.43 M/S
MV PEAK E VEL: 1.22 M/S
NEUTROPHILS # BLD AUTO: 3.7 K/UL (ref 1.8–7.7)
NEUTROPHILS NFR BLD: 57.5 % (ref 38–73)
PISA TR MAX VEL: 2.81 M/S
PLATELET # BLD AUTO: 248 K/UL (ref 150–350)
PLATELET BLD QL SMEAR: ABNORMAL
PMV BLD AUTO: 11 FL (ref 9.2–12.9)
POCT GLUCOSE: 146 MG/DL (ref 70–110)
POCT GLUCOSE: 154 MG/DL (ref 70–110)
POCT GLUCOSE: 169 MG/DL (ref 70–110)
POTASSIUM SERPL-SCNC: 4.9 MMOL/L (ref 3.5–5.1)
PULM VEIN S/D RATIO: 1.33
PV PEAK D VEL: 0.52 M/S
PV PEAK S VEL: 0.69 M/S
PV PEAK VELOCITY: 1.26 CM/S
RA MAJOR: 5.32 CM
RA WIDTH: 3.58 CM
RBC # BLD AUTO: 4.03 M/UL (ref 4–5.4)
RIGHT VENTRICULAR END-DIASTOLIC DIMENSION: 4.57 CM
RV TISSUE DOPPLER FREE WALL SYSTOLIC VELOCITY 1 (APICAL 4 CHAMBER VIEW): 11.79 M/S
SINUS: 3.14 CM
SODIUM SERPL-SCNC: 139 MMOL/L (ref 136–145)
STJ: 2.58 CM
TDI LATERAL: 0.1
TDI SEPTAL: 0.05
TDI: 0.08
TR MAX PG: 31.58 MMHG
TRICUSPID ANNULAR PLANE SYSTOLIC EXCURSION: 1.52 CM
WBC # BLD AUTO: 6.42 K/UL (ref 3.9–12.7)

## 2019-04-28 PROCEDURE — 80048 BASIC METABOLIC PNL TOTAL CA: CPT

## 2019-04-28 PROCEDURE — 99232 PR SUBSEQUENT HOSPITAL CARE,LEVL II: ICD-10-PCS | Mod: ,,, | Performed by: PSYCHIATRY & NEUROLOGY

## 2019-04-28 PROCEDURE — 94761 N-INVAS EAR/PLS OXIMETRY MLT: CPT

## 2019-04-28 PROCEDURE — 99232 SBSQ HOSP IP/OBS MODERATE 35: CPT | Mod: ,,, | Performed by: PSYCHIATRY & NEUROLOGY

## 2019-04-28 PROCEDURE — 25000003 PHARM REV CODE 250: Performed by: EMERGENCY MEDICINE

## 2019-04-28 PROCEDURE — 85025 COMPLETE CBC W/AUTO DIFF WBC: CPT

## 2019-04-28 PROCEDURE — 21400001 HC TELEMETRY ROOM

## 2019-04-28 PROCEDURE — 36415 COLL VENOUS BLD VENIPUNCTURE: CPT

## 2019-04-28 PROCEDURE — 25000003 PHARM REV CODE 250: Performed by: NURSE PRACTITIONER

## 2019-04-28 PROCEDURE — 63600175 PHARM REV CODE 636 W HCPCS: Performed by: EMERGENCY MEDICINE

## 2019-04-28 PROCEDURE — 25000003 PHARM REV CODE 250: Performed by: INTERNAL MEDICINE

## 2019-04-28 RX ORDER — PREGABALIN 50 MG/1
100 CAPSULE ORAL DAILY
Status: DISCONTINUED | OUTPATIENT
Start: 2019-04-29 | End: 2019-04-29 | Stop reason: HOSPADM

## 2019-04-28 RX ADMIN — SODIUM CHLORIDE: 0.9 INJECTION, SOLUTION INTRAVENOUS at 05:04

## 2019-04-28 RX ADMIN — ZONISAMIDE 200 MG: 100 CAPSULE ORAL at 09:04

## 2019-04-28 RX ADMIN — ACETAMINOPHEN 325 MG: 325 TABLET, FILM COATED ORAL at 12:04

## 2019-04-28 RX ADMIN — PRAVASTATIN SODIUM 80 MG: 40 TABLET ORAL at 09:04

## 2019-04-28 RX ADMIN — ACETAMINOPHEN 325 MG: 325 TABLET, FILM COATED ORAL at 09:04

## 2019-04-28 RX ADMIN — PRIMIDONE 100 MG: 50 TABLET ORAL at 08:04

## 2019-04-28 RX ADMIN — ENOXAPARIN SODIUM 40 MG: 100 INJECTION SUBCUTANEOUS at 04:04

## 2019-04-28 RX ADMIN — SODIUM CHLORIDE: 0.9 INJECTION, SOLUTION INTRAVENOUS at 11:04

## 2019-04-28 RX ADMIN — PIPERACILLIN AND TAZOBACTAM 4.5 G: 4; .5 INJECTION, POWDER, LYOPHILIZED, FOR SOLUTION INTRAVENOUS; PARENTERAL at 04:04

## 2019-04-28 RX ADMIN — PRIMIDONE 100 MG: 50 TABLET ORAL at 09:04

## 2019-04-28 RX ADMIN — INSULIN ASPART 2 UNITS: 100 INJECTION, SOLUTION INTRAVENOUS; SUBCUTANEOUS at 12:04

## 2019-04-28 RX ADMIN — INSULIN ASPART 2 UNITS: 100 INJECTION, SOLUTION INTRAVENOUS; SUBCUTANEOUS at 05:04

## 2019-04-28 RX ADMIN — PIPERACILLIN AND TAZOBACTAM 4.5 G: 4; .5 INJECTION, POWDER, LYOPHILIZED, FOR SOLUTION INTRAVENOUS; PARENTERAL at 06:04

## 2019-04-28 RX ADMIN — INSULIN ASPART 1 UNITS: 100 INJECTION, SOLUTION INTRAVENOUS; SUBCUTANEOUS at 09:04

## 2019-04-28 RX ADMIN — SODIUM CHLORIDE: 0.9 INJECTION, SOLUTION INTRAVENOUS at 12:04

## 2019-04-28 RX ADMIN — ZONISAMIDE 200 MG: 100 CAPSULE ORAL at 08:04

## 2019-04-28 RX ADMIN — PIPERACILLIN AND TAZOBACTAM 4.5 G: 4; .5 INJECTION, POWDER, LYOPHILIZED, FOR SOLUTION INTRAVENOUS; PARENTERAL at 11:04

## 2019-04-28 RX ADMIN — INSULIN DETEMIR 10 UNITS: 100 INJECTION, SOLUTION SUBCUTANEOUS at 08:04

## 2019-04-28 NOTE — ASSESSMENT & PLAN NOTE
Lab Results   Component Value Date    HGBA1C 8.6 (H) 04/27/2019   Obtain hgbA1c. Prandal and SSI and adjust accordingly once eating.

## 2019-04-28 NOTE — PROGRESS NOTES
Patient complained of headache level 4 at beginning of shift; as PRN medication had already been given, other comfort measures were employed such as repositioning, diversion conversation, and a quiet environment in the room by dimming lights and closing the door.  Patient was given PRN pain medication Tylenol at 0055 hours; follow-up within the hour proved medication was effective, as patient was able to sleep and had no further complaints of pain.

## 2019-04-28 NOTE — PROGRESS NOTES
[facility]  Neurology  Progress Note    Patient Name: Carolyn Mace  MRN: [id number]  Admission Date: 4/26/2019  Hospital Length of Stay: 1 days  Code Status: Full Code   Attending Provider: Magdalena Mccormick MD  Primary Care Physician: Franklin Chowdary MD   Principal Problem:Hypovolemia    Subjective:     Interval History: 60 y/o female with medical Hx as listed below was brought to ED after found her to be very lethargic. Upon arrival to ED she was markedly hypotensive (80/40's. Pt was given IV naloxone to no avail. Lab work up showed UTI. Pt has been consulted given her multiple potentially sedating medications which include, primidone, zonisamide, pregabalin, oxycodone, mirtazapine, lorazepam. Ms. Mace has Hx of seizures since age 17; reported to be well-controlled on two AED's. Pt is alert and fully oriented; still hypotensive.    -/4/28/19: Pt with no new symptoms.     Current Neurological Medications:     Current Facility-Administered Medications   Medication Dose Route Frequency Provider Last Rate Last Dose    0.9%  NaCl infusion   Intravenous Continuous RORY Gayle 150 mL/hr at 04/28/19 1233      acetaminophen tablet 325 mg  325 mg Oral Q6H PRN Victor Hugo Sung MD   325 mg at 04/28/19 0055    dextrose 50% injection 12.5 g  12.5 g Intravenous PRN Victor Hugo Sung MD        dextrose 50% injection 25 g  25 g Intravenous PRN Victor Hugo Sung MD        enoxaparin injection 40 mg  40 mg Subcutaneous Daily Victor Hugo Sung MD   40 mg at 04/27/19 1741    glucagon (human recombinant) injection 1 mg  1 mg Intramuscular PRN Victor Hugo Sung MD        glucose chewable tablet 16 g  16 g Oral PRN Victor Hugo Sung MD        glucose chewable tablet 24 g  24 g Oral PRN Victor Hugo Sung MD        insulin aspart U-100 pen 1-10 Units  1-10 Units Subcutaneous QID (AC + HS) PRN Victor Hugo Sung MD   2 Units at 04/28/19 1235    insulin detemir U-100 pen 10 Units  10 Units Subcutaneous QHS  Tabby Liao NP   10 Units at 04/27/19 2158    LORazepam tablet 2 mg  2 mg Oral Q6H PRN Jane V. Doran, FNP   2 mg at 04/27/19 2153    piperacillin-tazobactam 4.5 g in sodium chloride 0.9% 100 mL IVPB (ready to mix system)  4.5 g Intravenous Q8H Victor Hugo Sung MD 25 mL/hr at 04/28/19 0636 4.5 g at 04/28/19 0636    polyethylene glycol packet 17 g  17 g Oral Daily Victor Hugo Sung MD        pravastatin tablet 80 mg  80 mg Oral QHS Victor Hugo Sung MD   80 mg at 04/27/19 2153    primidone tablet 100 mg  100 mg Oral BID Jane V. Doran, FNP   100 mg at 04/28/19 0830    promethazine (PHENERGAN) 6.25 mg in dextrose 5 % 50 mL IVPB  6.25 mg Intravenous Q6H PRN Victor Hugo Sung MD        sodium chloride 0.9% flush 10 mL  10 mL Intravenous PRN Victor Hugo Sung MD        zonisamide capsule 200 mg  200 mg Oral BID Jane V. Doran, FNP   200 mg at 04/28/19 0830       Review of Systems   Constitutional: Negative for fever.   HENT: Negative for trouble swallowing.    Eyes: Negative for photophobia.   Respiratory: Negative for shortness of breath.    Cardiovascular: Negative for chest pain.   Gastrointestinal: Negative for abdominal pain.   Musculoskeletal: Negative for neck stiffness.   Neurological: Negative for headaches.   Psychiatric/Behavioral: Negative for hallucinations.         Objective:     Vital Signs (Most Recent):  Temp: 97.7 °F (36.5 °C) (04/28/19 1128)  Pulse: 85 (04/28/19 1128)  Resp: 17 (04/28/19 1128)  BP: 97/60 (04/28/19 1128)  SpO2: 97 % (04/28/19 1128) Vital Signs (24h Range):  Temp:  [97.5 °F (36.4 °C)-98.2 °F (36.8 °C)] 97.7 °F (36.5 °C)  Pulse:  [77-96] 85  Resp:  [17-19] 17  SpO2:  [93 %-97 %] 97 %  BP: ()/(57-72) 97/60     Weight: (!) 145.2 kg (320 lb)  Body mass index is 56.69 kg/m².    Physical Exam  Constitutional: She is oriented to person, place, and time. No distress.   HENT:   Head: Normocephalic.   Eyes: Right eye exhibits no discharge. Left eye exhibits no discharge.    Cardiovascular: Normal heart sounds.   Pulmonary/Chest: Breath sounds normal.   Abdominal: Soft.   Musculoskeletal: She exhibits edema.   Neurological: She is oriented to person, place, and time.   Skin: She is not diaphoretic.   Psychiatric: Her speech is normal.         NEUROLOGICAL EXAMINATION:      MENTAL STATUS   Oriented to person, place, and time.   Speech: speech is normal   Level of consciousness: alert     CRANIAL NERVES      CN III, IV, VI   Right pupil: Size: 2 mm. Shape: regular.   Left pupil: Size: 2 mm. Shape: regular.   Nystagmus: none   Ophthalmoparesis: none  Conjugate gaze: present     CN V   Right facial sensation deficit: none  Left facial sensation deficit: none     CN VII   Right facial weakness: none  Left facial weakness: none     CN IX, X   Palate: symmetric     CN XI   Right trapezius strength: normal  Left trapezius strength: normal     CN XII   Tongue deviation: none     MOTOR EXAM        5/5 in UE;   Moves LE's in bed but complaints of edema in legs that make her difficult to move LE's      SENSORY EXAM   Right arm light touch: normal  Left arm light touch: normal              Significant Labs:   CBC:   Recent Labs   Lab 04/26/19  1455 04/27/19  0623 04/28/19  0853   WBC 12.57 9.91 6.42   HGB 10.6* 10.3* 10.6*   HCT 34.0* 33.6* 34.2*    224 248     CMP:   Recent Labs   Lab 04/26/19  1455 04/27/19  0616 04/28/19  0453   * 174* 148*   * 137 139   K 4.1 4.3 4.9    108 110   CO2 28 23 22*   BUN 24* 22 17   CREATININE 1.8* 1.2 0.9   CALCIUM 9.5 8.4* 8.0*   MG 1.6  --   --    PROT 7.1  --   --    ALBUMIN 3.0*  --   --    BILITOT 0.3  --   --    ALKPHOS 95  --   --    AST 10  --   --    ALT 7*  --   --    ANIONGAP 5* 6* 7*   EGFRNONAA 30* 49* >60         Assessment and Plan:     60 y/o female consulted for lethargy     1. AMS: encephalopathy due to current UTI but also use of multiple potentially sedating meds (more os with her resolving ARF).           -Given her  long Hx of seizures and reported good control with zonisamide and primidone will continue home dose of meds.           -Would advise on limiting use oxycodone and also on slow weaning of lorazepam.   -No other recs for now.                Active Diagnoses:    Diagnosis Date Noted POA    PRINCIPAL PROBLEM:  Hypovolemia [E86.1] 04/27/2019 Yes    Acute cystitis without hematuria [N30.00] 04/26/2019 Yes    Pneumonia due to infectious organism [J18.9] 04/26/2019 Yes    Essential hypertension [I10] 04/26/2019 Yes    ARUNA superimposed on CKD (chronic kidney disease) stage 3, GFR 30-59 ml/min [N18.3] 04/26/2019 Yes    Acute encephalopathy [G93.40] 04/26/2019 Yes    Anemia of chronic disease [D63.8] 07/29/2016 Yes    Type 2 diabetes mellitus [E11.9] 07/16/2016 Yes     Chronic    Epilepsy [G40.909] 07/16/2016 Yes     Chronic    Opioid dependence [F11.20] 07/16/2016 Yes     Chronic      Problems Resolved During this Admission:       VTE Risk Mitigation (From admission, onward)        Ordered     enoxaparin injection 40 mg  Daily      04/26/19 1831     IP VTE HIGH RISK PATIENT  Once      04/26/19 1831          Harry Gomez MD  Neurology  [facility]

## 2019-04-28 NOTE — ASSESSMENT & PLAN NOTE
2/2 E coli  Did not meet criteria for sepsis but is hypotensive and presented with AMS  On zosyn to also cover for pneumonia  Plan to de-escalated to augmentin prior to discharge

## 2019-04-28 NOTE — ASSESSMENT & PLAN NOTE
Decrease rate of fluids and monitor vitals  Hopefully able to wean fluids today  Will add cortisol level with AM labs

## 2019-04-28 NOTE — PLAN OF CARE
Problem: Fall Injury Risk  Goal: Absence of Fall and Fall-Related Injury  Outcome: Ongoing (interventions implemented as appropriate)  Intervention: Identify and Manage Contributors to Fall Injury Risk     04/28/19 1804   Manage Acute Allergic Reaction   Medication Review/Management medications reviewed   Identify and Manage Contributors to Fall Injury Risk   Self-Care Promotion meal setup provided         Problem: Diabetes Comorbidity  Goal: Blood Glucose Level Within Desired Range  Outcome: Ongoing (interventions implemented as appropriate)  Intervention: Maintain Glycemic Control     04/28/19 1804   Monitor and Manage Ketoacidosis   Glycemic Management blood glucose monitoring;insulin dose matched to carbohydrate intake         Problem: Skin Injury Risk Increased  Goal: Skin Health and Integrity  Outcome: Ongoing (interventions implemented as appropriate)  Intervention: Optimize Skin Protection     04/28/19 1804   Prevent Additional Skin Injury   Head of Bed (HOB) HOB at 45 degrees   Pressure Reduction Devices positioning supports utilized   Pressure Reduction Techniques frequent weight shift encouraged   Monitor and Manage Hypervolemia   Skin Protection incontinence pads utilized

## 2019-04-28 NOTE — ASSESSMENT & PLAN NOTE
Suspect aspiration and the patient's family report that she is very sleepy and difficult to arouse for 3 days. Continue with empiric pip-tazo and avoid sedating meds. Stable at room air

## 2019-04-28 NOTE — PLAN OF CARE
Problem: Diabetes Comorbidity  Goal: Blood Glucose Level Within Desired Range    Intervention: Maintain Glycemic Control     04/28/19 0608   Monitor and Manage Ketoacidosis   Glycemic Management blood glucose monitoring;supplemental insulin given         Comments: Glycemic control practiced during shift with glucose monitoring and supplemental insulin administration.

## 2019-04-28 NOTE — ASSESSMENT & PLAN NOTE
2/2 infection and multiple sedating meds  Infection addressed and sedating agents limited  Keeping hemodynamics stable with fluids

## 2019-04-28 NOTE — SUBJECTIVE & OBJECTIVE
Interval History: feels better and wants to go home. BP better but still low on 150 cc/hr of isotonic fluids.     Review of Systems   Respiratory: Negative.    Cardiovascular: Negative.    Gastrointestinal: Negative.      Objective:     Vital Signs (Most Recent):  Temp: 97.7 °F (36.5 °C) (04/28/19 1128)  Pulse: 85 (04/28/19 1128)  Resp: 17 (04/28/19 1128)  BP: 97/60 (04/28/19 1128)  SpO2: 97 % (04/28/19 1128) Vital Signs (24h Range):  Temp:  [97.5 °F (36.4 °C)-98.2 °F (36.8 °C)] 97.7 °F (36.5 °C)  Pulse:  [77-96] 85  Resp:  [17-19] 17  SpO2:  [93 %-97 %] 97 %  BP: ()/(57-72) 97/60     Weight: (!) 145.2 kg (320 lb)  Body mass index is 56.69 kg/m².    Intake/Output Summary (Last 24 hours) at 4/28/2019 1522  Last data filed at 4/28/2019 1200  Gross per 24 hour   Intake 2403.33 ml   Output --   Net 2403.33 ml      Physical Exam   Constitutional: She is oriented to person, place, and time. She appears well-developed.   Morbid obese.    HENT:   Head: Atraumatic.   Eyes: EOM are normal.   Neck: Neck supple.   Cardiovascular: Normal rate and regular rhythm.   Murmur heard.  Pulmonary/Chest: Effort normal and breath sounds normal. No respiratory distress.   Abdominal: Soft. She exhibits no distension.   Musculoskeletal: Normal range of motion. She exhibits edema (trace BLE).   Neurological: She is alert and oriented to person, place, and time.   Somnolence but awakes when name called.    Skin: Skin is warm and dry.   Nursing note and vitals reviewed.      Significant Labs: All pertinent labs within the past 24 hours have been reviewed.    Significant Imaging: I have reviewed all pertinent imaging results/findings within the past 24 hours.  I have reviewed and interpreted all pertinent imaging results/findings within the past 24 hours.

## 2019-04-29 VITALS
RESPIRATION RATE: 18 BRPM | HEART RATE: 79 BPM | WEIGHT: 293 LBS | OXYGEN SATURATION: 97 % | DIASTOLIC BLOOD PRESSURE: 86 MMHG | HEIGHT: 63 IN | TEMPERATURE: 97 F | BODY MASS INDEX: 51.91 KG/M2 | SYSTOLIC BLOOD PRESSURE: 145 MMHG

## 2019-04-29 LAB
ANION GAP SERPL CALC-SCNC: 6 MMOL/L (ref 8–16)
BUN SERPL-MCNC: 12 MG/DL (ref 8–23)
CALCIUM SERPL-MCNC: 8.7 MG/DL (ref 8.7–10.5)
CHLORIDE SERPL-SCNC: 108 MMOL/L (ref 95–110)
CO2 SERPL-SCNC: 23 MMOL/L (ref 23–29)
CORTIS SERPL-MCNC: 7.6 UG/DL
CREAT SERPL-MCNC: 0.8 MG/DL (ref 0.5–1.4)
EST. GFR  (AFRICAN AMERICAN): >60 ML/MIN/1.73 M^2
EST. GFR  (NON AFRICAN AMERICAN): >60 ML/MIN/1.73 M^2
FERRITIN SERPL-MCNC: 133 NG/ML (ref 20–300)
GLUCOSE SERPL-MCNC: 103 MG/DL (ref 70–110)
POCT GLUCOSE: 112 MG/DL (ref 70–110)
POCT GLUCOSE: 153 MG/DL (ref 70–110)
POCT GLUCOSE: 76 MG/DL (ref 70–110)
POTASSIUM SERPL-SCNC: 4.1 MMOL/L (ref 3.5–5.1)
SODIUM SERPL-SCNC: 137 MMOL/L (ref 136–145)

## 2019-04-29 PROCEDURE — 80048 BASIC METABOLIC PNL TOTAL CA: CPT

## 2019-04-29 PROCEDURE — 82533 TOTAL CORTISOL: CPT

## 2019-04-29 PROCEDURE — 25000003 PHARM REV CODE 250: Performed by: EMERGENCY MEDICINE

## 2019-04-29 PROCEDURE — 25000003 PHARM REV CODE 250: Performed by: NURSE PRACTITIONER

## 2019-04-29 PROCEDURE — 36415 COLL VENOUS BLD VENIPUNCTURE: CPT

## 2019-04-29 PROCEDURE — 25000003 PHARM REV CODE 250: Performed by: INTERNAL MEDICINE

## 2019-04-29 PROCEDURE — 63600175 PHARM REV CODE 636 W HCPCS: Performed by: EMERGENCY MEDICINE

## 2019-04-29 RX ORDER — AMOXICILLIN AND CLAVULANATE POTASSIUM 875; 125 MG/1; MG/1
1 TABLET, FILM COATED ORAL 2 TIMES DAILY
Qty: 14 TABLET | Refills: 0 | Status: SHIPPED | OUTPATIENT
Start: 2019-04-29 | End: 2019-05-06

## 2019-04-29 RX ORDER — LORAZEPAM 0.5 MG/1
0.5 TABLET ORAL EVERY 6 HOURS PRN
Status: DISCONTINUED | OUTPATIENT
Start: 2019-04-29 | End: 2019-04-29 | Stop reason: HOSPADM

## 2019-04-29 RX ORDER — LORAZEPAM 2 MG/1
2 TABLET ORAL EVERY 12 HOURS PRN
Qty: 8 TABLET | Refills: 0
Start: 2019-04-29 | End: 2019-05-01

## 2019-04-29 RX ORDER — INSULIN GLARGINE 100 [IU]/ML
10 INJECTION, SOLUTION SUBCUTANEOUS NIGHTLY
Qty: 10 ML | Refills: 3
Start: 2019-04-29 | End: 2020-04-28

## 2019-04-29 RX ORDER — OXYCODONE HYDROCHLORIDE 5 MG/1
5 CAPSULE ORAL EVERY 8 HOURS PRN
Refills: 0
Start: 2019-04-29

## 2019-04-29 RX ADMIN — PIPERACILLIN AND TAZOBACTAM 4.5 G: 4; .5 INJECTION, POWDER, LYOPHILIZED, FOR SOLUTION INTRAVENOUS; PARENTERAL at 06:04

## 2019-04-29 RX ADMIN — ZONISAMIDE 200 MG: 100 CAPSULE ORAL at 08:04

## 2019-04-29 RX ADMIN — PRIMIDONE 100 MG: 50 TABLET ORAL at 08:04

## 2019-04-29 RX ADMIN — PREGABALIN 100 MG: 50 CAPSULE ORAL at 08:04

## 2019-04-29 RX ADMIN — ACETAMINOPHEN 325 MG: 325 TABLET, FILM COATED ORAL at 05:04

## 2019-04-29 NOTE — NURSING
Discharge instructions given to patient  at bedside. Patient verbalized an understanding and states a willingness to comply. Saline lock removed. Tele monitoring removed.

## 2019-04-29 NOTE — PROGRESS NOTES
Patient had 5 bottles of home medications in a bag on her bed, which were put in an envelope and given to house supervisor SREEDHAR Garcia to be placed in the safe.  Patient was provided the envelope flap ticket with appropriate storage number for retrieval.  Patient stated her son brought the medication for identification by doctors, since patient couldn't remember her medications.  Neurological checks were performed and patient is AOx4.  Pain management was practiced for right knee pain;  support pillows, repositioning and PRN medication Tylenol were all utilized to control pain.  Patient stated that the Tylenol dose was too low and did not help, although repositioning and pillows relieved some pain.  Still, patient maintained the level of pain was 8/10 before and after measures were taken to alleviate pain.  Incontinence care was given several times throughout the shift.  Report was given to SREEDHAR Rojas at bedside hand-off.

## 2019-04-29 NOTE — PLAN OF CARE
04/29/19 1048   Final Note   Assessment Type Final Discharge Note   Anticipated Discharge Disposition Home   Hospital Follow Up  Appt(s) scheduled? Yes   Discharge plans and expectations educations in teach back method with documentation complete? Yes   Right Care Referral Info   Post Acute Recommendation No Care   pts nurse Bob advised that all CM needs have been addressed and can D/C from CM standpoint.

## 2019-04-29 NOTE — DISCHARGE SUMMARY
"[facility] Hospital Medicine  Discharge Summary      Patient Name: Carolyn Mace  MRN: [id number]  Admission Date: 4/26/2019  Hospital Length of Stay: 2 days  Discharge Date and Time:  04/29/2019 11:15 AM  Attending Physician: Livan Finney MD   Discharging Provider: Livan Finney MD  Primary Care Provider: Franklin Chowdary MD      HPI:   Mrs. Mace is a 58 yo female with significant history for hypertension, epilepsy, diabetes, and chronic back/knee pain, and opiod dependencywho was brought to hospital via EMS for "in and out of consciousness" x3 days.  Patient is not able to provide history as very sleepy.  I am unable to get in touch with son Marco [phone] or [phone] or [phone].  Per ED, Per EMS, the pt's son reported the pt has been "in and out of consciousness." EMS reports an initial CBG of 301 mg/dL, BP of 90/56mmHg, pinpoint pupils and slurred speech. EMS administered a 350cc saline Bolus and 1mg of narcan which improved her slurred speech and respiratory rate. Additionally, the pt also reports urinary incontinence, dysuria, suprapubic, diarrhea and chills. The pt denies fever, chills, diaphoresis, nausea, emesis, abdominal pain, chest pain, cough, otalgia, sore throat and rash. She does not smoke cigarettes, consume EtOH or use illicit drugs. She does take Oxycontin 2mg x4/day for her chronic knee pain.  In ED, .   troponin less than 0.006.  BNP 34.  Lactate 0.8.  Procalcitonin 0.32.  sCr 1.8 (baseline ).  Urine showed 2+ protein, ketone trace, occult blood 1+, WBC more than 100, few bacteria.  Urine and blood culture collected in ED.  Influenza negative.  Chest x-ray showed bilateral airspace opacity.  Low-grade temp 99.6° Lowest blood pressure in ED 87/53.  Blood pressure improved with IV fluid 123/66.  (I cannot view in epic at this time). Zosyn and vancomycin given in ED.     * No surgery found *      Hospital Course:   Ms Mace " presented with acute encephalopathy. Patient was hypotensive and somnolent. Did not respond to narcan. She did have evidence of UTI and pneumonia. Started on IVFs and empiric antibiotics. Neurology consulted. Patient takes plenty of sedating meds. Resumed zonisamide and primidone for seizures, avoided narcotics and used lorazepam sparingly. Patient's mental status returned to normal. BCx showed NG and urine culture showed > 100,000 cfu/mL of E coli. She was on IV Abx with Zosyn,had ARUNA,which is resolved with IVF,bood pressure remains stable,she was alert time 3,metabolic encephalopathy is resolved,instructed cut back on home BP med;s,narcotics,ativan,insulin,patient has been discharged home with PCP follow up.     Consults:   Consults (From admission, onward)        Status Ordering Provider     Inpatient consult to Neurology  Once     Provider:  Harry Gomez MD    Completed LONNIE RETANA          Hypovolemia  Decrease rate of fluids and monitor vitals  Hopefully able to wean fluids today  Will add cortisol level with AM labs      Final Active Diagnoses:    Diagnosis Date Noted POA    PRINCIPAL PROBLEM:  Acute encephalopathy [G93.40] 04/26/2019 Yes    Hypovolemia [E86.1] 04/27/2019 Yes    Acute cystitis without hematuria [N30.00] 04/26/2019 Yes    Pneumonia due to infectious organism [J18.9] 04/26/2019 Yes    Essential hypertension [I10] 04/26/2019 Yes    ARUNA superimposed on CKD (chronic kidney disease) stage 3, GFR 30-59 ml/min [N18.3] 04/26/2019 Yes    Anemia of chronic disease [D63.8] 07/29/2016 Yes    Type 2 diabetes mellitus [E11.9] 07/16/2016 Yes     Chronic    Epilepsy [G40.909] 07/16/2016 Yes     Chronic    Opioid dependence [F11.20] 07/16/2016 Yes     Chronic      Problems Resolved During this Admission:       Discharged Condition: stable    Disposition: Home or Self Care    Follow Up:  Follow-up Information     Franklin Chowdary MD On 5/8/2019.    Specialty:  Internal Medicine  Why:  Appointment  scheduled for Wednesday May 8th at 2pm  Contact information:  [address] [facility] [address]  [phone]                 Patient Instructions:      Activity as tolerated       Significant Diagnostic Studies: Labs:   BMP:   Recent Labs   Lab 04/28/19  0453 04/29/19  0631   * 103    137   K 4.9 4.1    108   CO2 22* 23   BUN 17 12   CREATININE 0.9 0.8   CALCIUM 8.0* 8.7    and CBC   Recent Labs   Lab 04/28/19  0853   WBC 6.42   HGB 10.6*   HCT 34.2*        Microbiology:   Blood Culture   Lab Results   Component Value Date    LABBLOO No Growth to date 04/26/2019    LABBLOO No Growth to date 04/26/2019    LABBLOO No Growth to date 04/26/2019    and Urine Culture    Lab Results   Component Value Date    LABURIN ESCHERICHIA COLI  >100,000 cfu/ml   04/26/2019     Radiology: X-Ray: CXR: X-Ray Chest 1 View (CXR): No results found for this visit on 04/26/19. and X-Ray Chest PA and Lateral (CXR): No results found for this visit on 04/26/19.    Pending Diagnostic Studies:     None         Medications:  Reconciled Home Medications:      Medication List      START taking these medications    amoxicillin-clavulanate 875-125mg 875-125 mg per tablet  Commonly known as:  AUGMENTIN  Take 1 tablet by mouth 2 (two) times daily. for 7 days        CHANGE how you take these medications    insulin glargine 100 unit/mL injection  Commonly known as:  LANTUS  Inject 10 Units into the skin every evening.  What changed:  how much to take     LORazepam 2 MG Tab  Commonly known as:  ATIVAN  Take 1 tablet (2 mg total) by mouth every 12 (twelve) hours as needed (every 8 hours).  What changed:  when to take this     oxyCODONE 5 mg Cap  Commonly known as:  OXY-IR  Take 1 capsule (5 mg total) by mouth every 8 (eight) hours as needed.  What changed:  when to take this        CONTINUE taking these medications    acetaminophen 325 MG tablet  Commonly known as:  TYLENOL  Take 325 mg by mouth every 6 (six) hours as needed  for Pain.     acyclovir 200 MG capsule  Commonly known as:  ZOVIRAX  Take by mouth every 4 (four) hours while awake.     cholecalciferol (vitamin D3) 4,000 unit Cap  Take by mouth.     cyanocobalamin 1000 MCG tablet  Commonly known as:  VITAMIN B-12  Take 100 mcg by mouth once daily.     DULoxetine 60 MG capsule  Commonly known as:  CYMBALTA  Take 60 mg by mouth once daily.     levetiracetam oral soln 500 mg/5 mL (5 mL) Soln  Take 7.5 mLs (750 mg total) by mouth 2 (two) times daily.     metoprolol tartrate 25 MG tablet  Commonly known as:  LOPRESSOR  Take 25 mg by mouth 2 (two) times daily.     mirtazapine 15 MG tablet  Commonly known as:  REMERON  Take 7.5 mg by mouth every evening.     ondansetron 4 MG tablet  Commonly known as:  ZOFRAN  Take 1 tablet (4 mg total) by mouth every 6 (six) hours.     pravastatin 40 MG tablet  Commonly known as:  PRAVACHOL  Take 80 mg by mouth every evening.     pregabalin 50 MG capsule  Commonly known as:  LYRICA  Take 50 mg by mouth 3 (three) times daily.     primidone 50 MG Tab  Commonly known as:  MYSOLINE  Take 2 tablets (100 mg total) by mouth 2 (two) times daily.     zonisamide 100 MG Cap  Commonly known as:  ZONEGRAN  Take 200 mg by mouth 2 (two) times daily.        STOP taking these medications    amLODIPine 10 MG tablet  Commonly known as:  NORVASC     diltiaZEM 240 MG 24 hr capsule  Commonly known as:  CARDIZEM CD     hydrALAZINE 25 MG tablet  Commonly known as:  APRESOLINE     lisinopril 40 MG tablet  Commonly known as:  PRINIVIL,ZESTRIL     sulfamethoxazole-trimethoprim 800-160mg 800-160 mg Tab  Commonly known as:  BACTRIM DS     traZODone 100 MG tablet  Commonly known as:  DESYREL            Indwelling Lines/Drains at time of discharge:   Lines/Drains/Airways          None          Time spent on the discharge of patient: over 30  minutes  Patient was seen and examined on the date of discharge and determined to be suitable for discharge.         Livan Finney  MD  Department of Hospital Medicine  [facility]

## 2019-04-29 NOTE — PLAN OF CARE
Problem: Adult Inpatient Plan of Care  Goal: Optimal Comfort and Wellbeing    Intervention: Monitor Pain and Promote Comfort  Pain was monitored throughout shift; diversional conversation, repositioning, support pillow under the knee, and PRN medication was utilized to relieve pain.    Intervention: Provide Person-Centered Care     04/29/19 0769   Support Dyspnea Relief   Trust Relationship/Rapport care explained;empathic listening provided;thoughts/feelings acknowledged         Comments: Pain was managed throughout the shift with PRN medication, diversional conversations, support pillows and repositioning.  Patient stated the pain medication was ineffective but the other measures helped somewhat.

## 2019-05-01 LAB
BACTERIA BLD CULT: NORMAL
BACTERIA BLD CULT: NORMAL

## 2019-05-01 NOTE — PHYSICIAN QUERY
"PT Name: Carolyn Mace  MR #: [id number]    Physician Query Form - Pneumonia Clarification     CDS/: Alecia Frazier               Contact information: [phone]  This form is a permanent document in the medical record.    Query Date:  May 1, 2019    By submitting this query, we are merely seeking further clarification of documentation. Please utilize your independent clinical judgment when addressing the question(s) below.    The Medical record contains the following:   Indicators   Supporting Clinical Findings Location in Medical Record   x "Pneumonia" documented    Pneumonia due to infectious organism  Suspect aspiration and the patient's family report that she is very sleepy and difficult to arouse for 3 days. Continue with empiric pip-tazo and avoid sedating meds. Stable at room air          Progress note 4/28 (Marlon) HM   x Chest X-Ray: Impression       Ill-defined patchy bilateral airspace opacities with predominance in the right hemithorax.  The findings may represent evolving multifocal pneumonia or worsening pulmonary edema.    Stable enlargement of the cardiomediastinal silhouette.    Interval removal of right-sided access catheters.      Chest X-ray 4/26    PaO2    PaCO2     O2 sat      Cultures       Treatment       Supplemental O2     x Other HPI: Chest x-ray showed bilateral airspace opacity.  Low-grade temp 99.6° Lowest blood pressure in ED 87/53.  Blood pressure improved with IV fluid 123/66.  (I cannot view in epic at this time). Zosyn and vancomycin given in ED.     Hospital Course:  Ms Mace presented with acute encephalopathy. Patient was hypotensive and somnolent. Did not respond to narcan. She did have evidence of UTI and pneumonia. Started on IVFs and empiric antibiotics. Neurology consulted    Vital Signs (24h Range):  Temp:  [97.5 °F (36.4 °C)-98.2 °F (36.8 °C)] 97.7 °F (36.5 °C)  Pulse:  [77-96] 85  Resp:  [17-19] 17  SpO2:  [93 %-97 %] 97 %  BP: ()/(57-72) 97/60 " Progress note 4/28 (Marlon)        Provider, please specify type of pneumonia.    [ x  ] Aspiration Pneumonia   [   ] Other type of pneumonia (please specify):   [  ] Clinically undetermined         Please document in your progress notes daily for the duration of treatment, until resolved, and include in your discharge summary.    .

## 2020-08-06 NOTE — ED TRIAGE NOTES
Pt arrived to Ed due to reported altered mental status. EMS states finding pt obtunded, and gave 2mg of narcan. Reported UTI   2. The status of comorbities. (See ED/admit documents)

## 2020-11-10 PROCEDURE — P9612 CATHETERIZE FOR URINE SPEC: HCPCS

## 2020-11-10 PROCEDURE — 99283 EMERGENCY DEPT VISIT LOW MDM: CPT | Mod: 25

## 2020-11-11 ENCOUNTER — HOSPITAL ENCOUNTER (EMERGENCY)
Facility: HOSPITAL | Age: 60
Discharge: HOME | End: 2020-11-11
Attending: EMERGENCY MEDICINE
Payer: OTHER GOVERNMENT

## 2020-11-11 VITALS
RESPIRATION RATE: 18 BRPM | TEMPERATURE: 98 F | DIASTOLIC BLOOD PRESSURE: 63 MMHG | OXYGEN SATURATION: 98 % | HEIGHT: 64 IN | SYSTOLIC BLOOD PRESSURE: 128 MMHG | BODY MASS INDEX: 50.02 KG/M2 | HEART RATE: 88 BPM | WEIGHT: 293 LBS

## 2020-11-11 DIAGNOSIS — N30.90 CYSTITIS: Primary | ICD-10-CM

## 2020-11-11 LAB
BACTERIA #/AREA URNS HPF: ABNORMAL /HPF
BILIRUB UR QL STRIP: NEGATIVE
CLARITY UR: ABNORMAL
COLOR UR: YELLOW
GLUCOSE UR QL STRIP: ABNORMAL
HGB UR QL STRIP: NEGATIVE
KETONES UR QL STRIP: NEGATIVE
LEUKOCYTE ESTERASE UR QL STRIP: ABNORMAL
MICROSCOPIC COMMENT: ABNORMAL
NITRITE UR QL STRIP: NEGATIVE
PH UR STRIP: 6 [PH] (ref 5–8)
PROT UR QL STRIP: NEGATIVE
SP GR UR STRIP: 1.02 (ref 1–1.03)
URN SPEC COLLECT METH UR: ABNORMAL
UROBILINOGEN UR STRIP-ACNC: NEGATIVE EU/DL
WBC #/AREA URNS HPF: 5 /HPF (ref 0–5)

## 2020-11-11 PROCEDURE — 87077 CULTURE AEROBIC IDENTIFY: CPT

## 2020-11-11 PROCEDURE — 87088 URINE BACTERIA CULTURE: CPT

## 2020-11-11 PROCEDURE — 87086 URINE CULTURE/COLONY COUNT: CPT

## 2020-11-11 PROCEDURE — 25000003 PHARM REV CODE 250: Performed by: PHYSICIAN ASSISTANT

## 2020-11-11 PROCEDURE — 87186 SC STD MICRODIL/AGAR DIL: CPT

## 2020-11-11 PROCEDURE — 81000 URINALYSIS NONAUTO W/SCOPE: CPT

## 2020-11-11 RX ORDER — CIPROFLOXACIN 500 MG/1
500 TABLET ORAL 2 TIMES DAILY
Qty: 9 TABLET | Refills: 0 | Status: SHIPPED | OUTPATIENT
Start: 2020-11-11 | End: 2020-11-16

## 2020-11-11 RX ORDER — CIPROFLOXACIN 500 MG/1
500 TABLET ORAL
Status: COMPLETED | OUTPATIENT
Start: 2020-11-11 | End: 2020-11-11

## 2020-11-11 RX ADMIN — CIPROFLOXACIN 500 MG: 500 TABLET, FILM COATED ORAL at 04:11

## 2020-11-11 NOTE — ED TRIAGE NOTES
Pt arrived to the ED complaining of bilateral knee and foot swelling that has been ongoing for the past 4 years. Pt also states that she believes that she has a UTI. Pt denies n/v, chest pain, and SOB. Pt is in no acute distress.

## 2020-11-11 NOTE — DISCHARGE INSTRUCTIONS
Take antibiotics as prescribed, try to take with meals to limit nausea.  Follow-up with primary care provider for re-evaluation.

## 2020-11-11 NOTE — ED PROVIDER NOTES
Encounter Date: 11/10/2020       History     Chief Complaint   Patient presents with    Knee Pain     Pt c/o bilateral knee pain x10 years with swelling x 3 years. [facility].     56yo F with morbid obesity, IDDM, epilepsy, HTN, HLD, osteoporosis, hx renal failure, chronic bilateral knee pain, chronic low back pain, depression, giant cell tumor tendon,OA, neuropathy, physical deconditioning, papilledema, hx tension headache, hx viral meningitis, hx coma, presents to ED via EMS due to suspicion for UTI.    Patient states she has had dysuria times 6-7 months.  She states she has been hospitalized in the past due to cystitis.  No fever or chills or body aches.  No acute worsening of symptoms.  No recent antibiotics.  No abdominal pain.  No nausea vomiting.  No other acute complaints.  No alleviating or exacerbating factors.  No radiation of symptoms.  No flank pain.            Review of patient's allergies indicates:   Allergen Reactions    Tegretol [carbamazepine]      Past Medical History:   Diagnosis Date    Diabetes mellitus     Epilepsy     Hypertension     Osteoporosis      Past Surgical History:   Procedure Laterality Date    ABDOMINAL SURGERY      BREAST SURGERY      CHOLECYSTECTOMY       History reviewed. No pertinent family history.  Social History     Tobacco Use    Smoking status: Never Smoker    Smokeless tobacco: Never Used   Substance Use Topics    Alcohol use: No    Drug use: No     Review of Systems   Constitutional: Negative for appetite change, chills, fatigue and fever.   Respiratory: Negative for shortness of breath.    Cardiovascular: Negative for chest pain.   Gastrointestinal: Negative for abdominal pain, nausea and vomiting.   Genitourinary: Positive for dysuria and frequency. Negative for flank pain.   Musculoskeletal: Negative for myalgias, neck pain and neck stiffness.       Physical Exam     Initial Vitals  [11/10/20 2328]   BP Pulse Resp Temp SpO2   (!) 146/82 94 18 97.6 °F (36.4 °C) 98 %      MAP       --         Physical Exam    Nursing note and vitals reviewed.  Constitutional: She appears well-developed and well-nourished. She is not diaphoretic. No distress.   Resting recumbent in bed. NAD. Morbidly obese.   HENT:   Head: Normocephalic and atraumatic.   Eyes: EOM are normal.   Neck: Neck supple.   Cardiovascular: Intact distal pulses.   1+DP bilaterally   Pulmonary/Chest: No respiratory distress.   Neurological: She is alert and oriented to person, place, and time. GCS score is 15. GCS eye subscore is 4. GCS verbal subscore is 5. GCS motor subscore is 6.   Skin: Skin is warm.   Psychiatric: She has a normal mood and affect. Thought content normal.         ED Course   Procedures  Labs Reviewed   URINALYSIS - Abnormal; Notable for the following components:       Result Value    Appearance, UA Hazy (*)     Glucose, UA 1+ (*)     Leukocytes, UA Trace (*)     All other components within normal limits   URINALYSIS MICROSCOPIC - Abnormal; Notable for the following components:    Bacteria Many (*)     All other components within normal limits   CULTURE, URINE          Imaging Results    None          Medical Decision Making:   Initial Assessment:   61yo F with suspicion for cystitis; 6-7 month hx dysuria.   Differential Diagnosis:   UTI, pyelonephritis, chronic UTI, chronic dysuria  Clinical Tests:   Lab Tests: Ordered  ED Management:  Morbidly obese, sedentary, bedbound. Dysuria x months. No acute complaints. Brought to ED via EMS. Vitals reassuring. Catheterized UA pending.                              Clinical Impression:     ICD-10-CM ICD-9-CM   1. Cystitis  N30.90 595.9                      Disposition:   Disposition: Discharged  Condition: Stable     ED Disposition Condition    Discharge Stable        ED Prescriptions     Medication Sig Dispense Start Date End Date Auth. Provider    ciprofloxacin HCl (CIPRO) 500 MG  tablet Take 1 tablet (500 mg total) by mouth 2 (two) times daily. for 5 days 9 tablet 11/11/2020 11/16/2020 Ricardo Baumann PA-C        Follow-up Information     Follow up With Specialties Details Why Contact Info    Franklin Chowdary MD Internal Medicine Schedule an appointment as soon as possible for a visit  For reevaluation [address] [place] [id number]  [phone]                                         Ricardo Baumann PA-C  11/11/20 [id number]

## 2020-11-18 LAB — BACTERIA UR CULT: ABNORMAL
